# Patient Record
Sex: FEMALE | Race: WHITE | HISPANIC OR LATINO | Employment: PART TIME | URBAN - METROPOLITAN AREA
[De-identification: names, ages, dates, MRNs, and addresses within clinical notes are randomized per-mention and may not be internally consistent; named-entity substitution may affect disease eponyms.]

---

## 2013-11-05 LAB
EXTERNAL HIV CONFIRMATION: NORMAL
EXTERNAL HIV SCREEN: NORMAL

## 2017-03-09 ENCOUNTER — HOSPITAL ENCOUNTER (EMERGENCY)
Facility: HOSPITAL | Age: 42
Discharge: HOME | End: 2017-03-09
Admitting: EMERGENCY MEDICINE
Payer: COMMERCIAL

## 2017-03-09 VITALS
HEART RATE: 75 BPM | DIASTOLIC BLOOD PRESSURE: 91 MMHG | WEIGHT: 148 LBS | BODY MASS INDEX: 27.96 KG/M2 | RESPIRATION RATE: 17 BRPM | SYSTOLIC BLOOD PRESSURE: 134 MMHG | OXYGEN SATURATION: 94 % | TEMPERATURE: 98.6 F

## 2017-03-09 DIAGNOSIS — Z76.0 MEDICATION REFILL: Primary | ICD-10-CM

## 2017-03-09 PROCEDURE — 99281 EMR DPT VST MAYX REQ PHY/QHP: CPT

## 2017-03-09 RX ORDER — HYDROXYZINE PAMOATE 25 MG/1
25 CAPSULE ORAL 3 TIMES DAILY PRN
Qty: 15 CAPSULE | Refills: 0 | Status: SHIPPED | OUTPATIENT
Start: 2017-03-09 | End: 2019-01-10

## 2017-03-09 RX ORDER — CLONAZEPAM 0.5 MG/1
0.5 TABLET, ORALLY DISINTEGRATING ORAL ONCE AS NEEDED
Qty: 6 TABLET | Refills: 0 | Status: SHIPPED | OUTPATIENT
Start: 2017-03-09 | End: 2019-01-10

## 2019-01-10 ENCOUNTER — OFFICE VISIT (OUTPATIENT)
Dept: FAMILY MEDICINE CLINIC | Facility: CLINIC | Age: 44
End: 2019-01-10

## 2019-01-10 VITALS
BODY MASS INDEX: 28.34 KG/M2 | HEART RATE: 86 BPM | OXYGEN SATURATION: 96 % | RESPIRATION RATE: 17 BRPM | SYSTOLIC BLOOD PRESSURE: 124 MMHG | TEMPERATURE: 97.8 F | WEIGHT: 150 LBS | DIASTOLIC BLOOD PRESSURE: 82 MMHG

## 2019-01-10 DIAGNOSIS — K21.9 GASTROESOPHAGEAL REFLUX DISEASE, ESOPHAGITIS PRESENCE NOT SPECIFIED: ICD-10-CM

## 2019-01-10 DIAGNOSIS — F43.10 PTSD (POST-TRAUMATIC STRESS DISORDER): ICD-10-CM

## 2019-01-10 DIAGNOSIS — Z23 NEED FOR VACCINATION: ICD-10-CM

## 2019-01-10 DIAGNOSIS — F41.1 GENERALIZED ANXIETY DISORDER: Primary | ICD-10-CM

## 2019-01-10 DIAGNOSIS — Z63.79 STRESSFUL LIFE EVENT AFFECTING FAMILY: ICD-10-CM

## 2019-01-10 PROCEDURE — 99213 OFFICE O/P EST LOW 20 MIN: CPT | Performed by: FAMILY MEDICINE

## 2019-01-10 RX ORDER — OMEPRAZOLE 40 MG/1
40 CAPSULE, DELAYED RELEASE ORAL DAILY
Refills: 2 | COMMUNITY
Start: 2018-11-15 | End: 2019-01-10

## 2019-01-10 RX ORDER — OMEPRAZOLE 40 MG/1
40 CAPSULE, DELAYED RELEASE ORAL DAILY
Start: 2019-01-10

## 2019-01-10 NOTE — ASSESSMENT & PLAN NOTE
Counseled patient on dietary modifications and specific diet recommendations that will help to best control GERD symptoms  These practices would include limiting or eliminating caffeinated foods and beverages including chocolate, coffee and soda  Also limiting the intake of spicy foods or anything else that exacerbate symptoms  Will continue patient on PPI  Advised nothing by mouth at least 2-3 hours prior to lying down for bed  Advise placing the head of the bed on riser is to assist in gravity keeping stomach acid down

## 2019-01-10 NOTE — ASSESSMENT & PLAN NOTE
Son (23 yrs old) currently incarcerated  Children with history of drug abuse causing patient to have mental break ~3 years ago, was hospitalized in psych in [place] at that time

## 2019-01-10 NOTE — PROGRESS NOTES
Assessment/Plan:    Gastroesophageal reflux disease  Counseled patient on dietary modifications and specific diet recommendations that will help to best control GERD symptoms  These practices would include limiting or eliminating caffeinated foods and beverages including chocolate, coffee and soda  Also limiting the intake of spicy foods or anything else that exacerbate symptoms  Will continue patient on PPI  Advised nothing by mouth at least 2-3 hours prior to lying down for bed  Advise placing the head of the bed on riser is to assist in gravity keeping stomach acid down      Generalized anxiety disorder  Currently affiliated with psychiatry  Continue medication as prescribed by psych    PTSD (post-traumatic stress disorder)  As per CATHERINE    Stressful life event affecting family  Son (22 yrs old) currently incarcerated  Children with history of drug abuse causing patient to have mental break ~3 years ago, was hospitalized in [facility] in [place] at that time       Diagnoses and all orders for this visit:    Generalized anxiety disorder    Need for vaccination  -     TDAP VACCINE GREATER THAN OR EQUAL TO 8YO IM  -     SYRINGE/SINGLE-DOSE VIAL: influenza vaccine, [id number], quadrivalent, 0 5 mL, preservative-free ([facility] [address] [address] [facility])  - I did discuss the importance of vaccinations with the patient as has the child's pediatrician and she continues to refuse to get vaccines for herself at this time despite an increase wrist to the child with a known diagnosis of respiratory illness    Gastroesophageal reflux disease, esophagitis presence not specified  -     omeprazole (PriLOSEC) 40 MG capsule; Take 1 capsule (40 mg total) by mouth daily    Stressful life event affecting family    PTSD (post-traumatic stress disorder)    Other orders  -     Discontinue: omeprazole (PriLOSEC) 40 MG capsule; Take 40 mg by mouth daily          Subjective:      Patient ID: Rosa Devine is a 41 y o  female      This is a 27-year-old female who comes into the clinic today with her grandson seeking to have a physical form filled out to be legal caregiver for the child as the child's father, her 22-year-old son is currently incarcerated  The patient has not been to our clinic in quite some time, so I informed her that we will address the questions strictly pertaining to the approval form at hand and that she can come back separately for a full physical visit at her convenience  In regards to the form, all questions are cured specifically towards medical history and any current physical conditions or illnesses that would prevent her from caring for the child that hand  The patient is honest and open the about her extensive mental history since her teens including but not limited to medications, Psychiatry, and multiple hospitalizations for mental health  The patient states most recently, 3 years ago was her last hospitalization for mental health and came at a time in both of her sons were utilizing illicit drugs  The child to be cared for does have torticollis as well as diagnosed sleep apnea and is on a 24 hour heart monitor to ensure heart rates, pulse ox, and respirations are appropriate for a child of his age  The patient expresses no concerns in caring for the child  The following portions of the patient's history were reviewed and updated as appropriate: allergies, current medications, past family history, past medical history, past social history, past surgical history and problem list     Review of Systems   Constitutional: Negative for appetite change, chills, fatigue, fever and unexpected weight change  HENT: Negative for congestion, ear pain, rhinorrhea, sinus pain, sinus pressure and sore throat  Eyes: Negative for visual disturbance  Respiratory: Negative for cough, chest tightness, shortness of breath and wheezing  Cardiovascular: Negative for chest pain, palpitations and leg swelling  Gastrointestinal: Negative for abdominal pain, constipation, diarrhea, nausea and vomiting  Genitourinary: Negative for dysuria and flank pain  Musculoskeletal: Negative for arthralgias, back pain, myalgias and neck pain  Skin: Negative for rash  Neurological: Negative for dizziness, light-headedness and headaches  Psychiatric/Behavioral: Negative for suicidal ideas  Objective:      /82 (BP Location: Left arm, Patient Position: Sitting, Cuff Size: Adult)   Pulse 86   Temp 97 8 °F (36 6 °C) (Temporal)   Resp 17   Wt 68 kg (150 lb)   SpO2 96%   BMI 28 34 kg/m²          Physical Exam   Constitutional: She is oriented to person, place, and time  She appears well-developed and well-nourished  No distress  HENT:   Head: Normocephalic and atraumatic  Eyes: Pupils are equal, round, and reactive to light  Neck: Normal range of motion  Pulmonary/Chest: Effort normal    Musculoskeletal: Normal range of motion  She exhibits no edema or tenderness  Neurological: She is alert and oriented to person, place, and time  Skin: Skin is warm and dry  No rash noted  Psychiatric: She has a normal mood and affect   Her behavior is normal

## 2020-12-04 ENCOUNTER — OFFICE VISIT (OUTPATIENT)
Dept: FAMILY MEDICINE CLINIC | Facility: CLINIC | Age: 45
End: 2020-12-04

## 2020-12-04 VITALS
BODY MASS INDEX: 29.57 KG/M2 | OXYGEN SATURATION: 99 % | TEMPERATURE: 98.7 F | HEIGHT: 61 IN | HEART RATE: 112 BPM | DIASTOLIC BLOOD PRESSURE: 68 MMHG | RESPIRATION RATE: 18 BRPM | WEIGHT: 156.6 LBS | SYSTOLIC BLOOD PRESSURE: 118 MMHG

## 2020-12-04 DIAGNOSIS — R13.10 DYSPHAGIA, UNSPECIFIED TYPE: ICD-10-CM

## 2020-12-04 DIAGNOSIS — Z23 ENCOUNTER FOR IMMUNIZATION: ICD-10-CM

## 2020-12-04 DIAGNOSIS — K21.9 GASTROESOPHAGEAL REFLUX DISEASE, UNSPECIFIED WHETHER ESOPHAGITIS PRESENT: ICD-10-CM

## 2020-12-04 DIAGNOSIS — N92.0 MENORRHAGIA WITH REGULAR CYCLE: ICD-10-CM

## 2020-12-04 DIAGNOSIS — Z00.00 ANNUAL PHYSICAL EXAM: Primary | ICD-10-CM

## 2020-12-04 DIAGNOSIS — R53.83 TIREDNESS: ICD-10-CM

## 2020-12-04 PROCEDURE — 99396 PREV VISIT EST AGE 40-64: CPT | Performed by: FAMILY MEDICINE

## 2020-12-04 PROCEDURE — U0003 INFECTIOUS AGENT DETECTION BY NUCLEIC ACID (DNA OR RNA); SEVERE ACUTE RESPIRATORY SYNDROME CORONAVIRUS 2 (SARS-COV-2) (CORONAVIRUS DISEASE [COVID-19]), AMPLIFIED PROBE TECHNIQUE, MAKING USE OF HIGH THROUGHPUT TECHNOLOGIES AS DESCRIBED BY CMS-2020-01-R: HCPCS | Performed by: FAMILY MEDICINE

## 2020-12-04 PROCEDURE — 3008F BODY MASS INDEX DOCD: CPT | Performed by: FAMILY MEDICINE

## 2020-12-04 PROCEDURE — 1036F TOBACCO NON-USER: CPT | Performed by: FAMILY MEDICINE

## 2020-12-05 LAB — SARS-COV-2 RNA SPEC QL NAA+PROBE: NOT DETECTED

## 2020-12-07 ENCOUNTER — TELEPHONE (OUTPATIENT)
Dept: URGENT CARE | Facility: MEDICAL CENTER | Age: 45
End: 2020-12-07

## 2020-12-07 NOTE — RESULT ENCOUNTER NOTE
Please inform patient of a negative COVID test  We will advice her to continue to social distance, wear a mask and wash hands along with avoiding large crowd  I noticed that the strep test was not done yet, is there a particular reason why?   She can maintain her follow up appointment with us and we will reevaluate her again at that time   Thank you

## 2020-12-09 ENCOUNTER — LAB (OUTPATIENT)
Dept: LAB | Age: 45
End: 2020-12-09
Payer: COMMERCIAL

## 2020-12-09 ENCOUNTER — CLINICAL SUPPORT (OUTPATIENT)
Dept: URGENT CARE | Age: 45
End: 2020-12-09
Payer: COMMERCIAL

## 2020-12-09 DIAGNOSIS — N92.0 MENORRHAGIA WITH REGULAR CYCLE: ICD-10-CM

## 2020-12-09 DIAGNOSIS — R53.83 TIREDNESS: ICD-10-CM

## 2020-12-09 DIAGNOSIS — R13.10 DYSPHAGIA, UNSPECIFIED TYPE: ICD-10-CM

## 2020-12-09 LAB
BASOPHILS # BLD AUTO: 0.03 THOUSANDS/ΜL (ref 0–0.1)
BASOPHILS NFR BLD AUTO: 1 % (ref 0–1)
EOSINOPHIL # BLD AUTO: 0.03 THOUSAND/ΜL (ref 0–0.61)
EOSINOPHIL NFR BLD AUTO: 1 % (ref 0–6)
ERYTHROCYTE [DISTWIDTH] IN BLOOD BY AUTOMATED COUNT: 21.7 % (ref 11.6–15.1)
HCT VFR BLD AUTO: 32.3 % (ref 34.8–46.1)
HGB BLD-MCNC: 8.7 G/DL (ref 11.5–15.4)
IMM GRANULOCYTES # BLD AUTO: 0.01 THOUSAND/UL (ref 0–0.2)
IMM GRANULOCYTES NFR BLD AUTO: 0 % (ref 0–2)
LYMPHOCYTES # BLD AUTO: 1.76 THOUSANDS/ΜL (ref 0.6–4.47)
LYMPHOCYTES NFR BLD AUTO: 32 % (ref 14–44)
MCH RBC QN AUTO: 17 PG (ref 26.8–34.3)
MCHC RBC AUTO-ENTMCNC: 26.9 G/DL (ref 31.4–37.4)
MCV RBC AUTO: 63 FL (ref 82–98)
MONOCYTES # BLD AUTO: 0.52 THOUSAND/ΜL (ref 0.17–1.22)
MONOCYTES NFR BLD AUTO: 10 % (ref 4–12)
NEUTROPHILS # BLD AUTO: 3.1 THOUSANDS/ΜL (ref 1.85–7.62)
NEUTS SEG NFR BLD AUTO: 56 % (ref 43–75)
NRBC BLD AUTO-RTO: 0 /100 WBCS
PLATELET # BLD AUTO: 310 THOUSANDS/UL (ref 149–390)
RBC # BLD AUTO: 5.13 MILLION/UL (ref 3.81–5.12)
TSH SERPL DL<=0.05 MIU/L-ACNC: 1.39 UIU/ML (ref 0.36–3.74)
WBC # BLD AUTO: 5.45 THOUSAND/UL (ref 4.31–10.16)

## 2020-12-09 PROCEDURE — 36415 COLL VENOUS BLD VENIPUNCTURE: CPT

## 2020-12-09 PROCEDURE — 85025 COMPLETE CBC W/AUTO DIFF WBC: CPT

## 2020-12-09 PROCEDURE — 84443 ASSAY THYROID STIM HORMONE: CPT

## 2020-12-10 ENCOUNTER — TELEPHONE (OUTPATIENT)
Dept: FAMILY MEDICINE CLINIC | Facility: CLINIC | Age: 45
End: 2020-12-10

## 2020-12-10 ENCOUNTER — TELEPHONE (OUTPATIENT)
Dept: LAB | Facility: HOSPITAL | Age: 45
End: 2020-12-10

## 2020-12-10 DIAGNOSIS — N92.0 MENORRHAGIA WITH REGULAR CYCLE: Primary | ICD-10-CM

## 2020-12-10 DIAGNOSIS — D50.8 OTHER IRON DEFICIENCY ANEMIA: ICD-10-CM

## 2020-12-10 RX ORDER — FERROUS SULFATE TAB EC 324 MG (65 MG FE EQUIVALENT) 324 (65 FE) MG
324 TABLET DELAYED RESPONSE ORAL EVERY OTHER DAY
Qty: 30 TABLET | Refills: 2 | Status: SHIPPED | OUTPATIENT
Start: 2020-12-10 | End: 2021-06-03

## 2020-12-10 RX ORDER — MULTIVIT WITH MINERALS/LUTEIN
250 TABLET ORAL DAILY
Qty: 30 TABLET | Refills: 3 | Status: SHIPPED | OUTPATIENT
Start: 2020-12-10 | End: 2021-04-16

## 2020-12-11 ENCOUNTER — TELEPHONE (OUTPATIENT)
Dept: FAMILY MEDICINE CLINIC | Facility: CLINIC | Age: 45
End: 2020-12-11

## 2020-12-11 DIAGNOSIS — R13.10 DYSPHAGIA, UNSPECIFIED TYPE: Primary | ICD-10-CM

## 2020-12-21 ENCOUNTER — TELEMEDICINE (OUTPATIENT)
Dept: FAMILY MEDICINE CLINIC | Facility: CLINIC | Age: 45
End: 2020-12-21

## 2020-12-21 DIAGNOSIS — R13.10 DYSPHAGIA, UNSPECIFIED TYPE: Primary | ICD-10-CM

## 2020-12-21 PROCEDURE — G2012 BRIEF CHECK IN BY MD/QHP: HCPCS | Performed by: INTERNAL MEDICINE

## 2021-01-05 ENCOUNTER — TELEMEDICINE (OUTPATIENT)
Dept: FAMILY MEDICINE CLINIC | Facility: CLINIC | Age: 46
End: 2021-01-05

## 2021-01-05 DIAGNOSIS — Z12.4 CERVICAL CANCER SCREENING: Primary | ICD-10-CM

## 2021-01-05 DIAGNOSIS — B34.9 VIRAL INFECTION, UNSPECIFIED: ICD-10-CM

## 2021-01-05 DIAGNOSIS — Z03.818 ENCOUNTER FOR OBSERVATION FOR SUSPECTED EXPOSURE TO OTHER BIOLOGICAL AGENTS RULED OUT: ICD-10-CM

## 2021-01-05 DIAGNOSIS — N92.0 MENORRHAGIA WITH REGULAR CYCLE: ICD-10-CM

## 2021-01-05 PROCEDURE — G2012 BRIEF CHECK IN BY MD/QHP: HCPCS | Performed by: NURSE PRACTITIONER

## 2021-01-05 NOTE — PROGRESS NOTES
COVID-19 Virtual Visit     Assessment/Plan:    Problem List Items Addressed This Visit        Other    Heavy menses    Relevant Orders    Iron Panel (Includes Ferritin, Iron Sat%, Iron, and TIBC)    CBC and differential    Viral infection, unspecified    Relevant Orders    Novel Coronavirus (COVID-19), PCR [facility] - Collected at [place] [address] or [facility]      Other Visit Diagnoses     Cervical cancer screening    -  Primary    Relevant Orders    Ambulatory referral to Obstetrics / Gynecology    Encounter for observation for suspected exposure to other biological agents ruled out        Relevant Orders    Novel Coronavirus (COVID-19), PCR [facility] - Collected at [place] [address] or [facility]         Disposition:     I recommended self-quarantine for 14 days and to call back for worsening symptoms or development of shortness of breath  I referred patient to one of our centralized sites for a COVID-19 swab  I have spent 20 minutes directly with the patient  Encounter provider MILLA Mauro    Provider located at [address] [place] [id number] [phone]    Recent Visits  No visits were found meeting these conditions  Showing recent visits within past 7 days and meeting all other requirements     Today's Visits  Date Type Provider Dept   01/05/21 Telemedicine MILLA Mauro [facility] Rosa   Showing today's visits and meeting all other requirements     Future Appointments  No visits were found meeting these conditions  Showing future appointments within next 150 days and meeting all other requirements        Patient agrees to participate in a virtual check in via telephone or video visit instead of presenting to the office to address urgent/immediate medical needs  Patient is aware this is a billable service  After connecting through Telephone, the patient was identified by name and date of birth   [facility] was informed that this was a telemedicine visit and that the exam was being conducted confidentially over secure lines  My office door was closed  No one else was in the room  Nimo Wilson acknowledged consent and understanding of privacy and security of the telemedicine visit  I informed the patient that I have reviewed her record in Epic and presented the opportunity for her to ask any questions regarding the visit today  The patient agreed to participate  It was my intent to perform this visit via video technology but the patient was not able to do a video connection so the visit was completed via audio telephone only  Subjective:   Nimo Wilson is a 39 y o  female who is concerned about COVID-19  Patient's symptoms include fatigue, nasal congestion, rhinorrhea, sore throat, cough, myalgias and headache  Patient denies fever, chills, anosmia, loss of taste, shortness of breath, abdominal pain, nausea, vomiting and diarrhea       Exposure:   Contact with a person who is under investigation (PUI) for or who is positive for COVID-19 within the last 14 days?: No    Hospitalized recently for fever and/or lower respiratory symptoms?: No      Currently a healthcare worker that is involved in direct patient care?: No      Works in a special setting where the risk of COVID-19 transmission may be high? (this may include long-term care, correctional and long-term facilities; homeless shelters; assisted-living facilities and group homes ): No      Resident in a special setting where the risk of COVID-19 transmission may be high? (this may include long-term care, correctional and long-term facilities; homeless shelters; assisted-living facilities and group homes ): No      Lab Results   Component Value Date    SARSCOV2 Not Detected 2020     Past Medical History:   Diagnosis Date    Anxiety      Past Surgical History:   Procedure Laterality Date     SECTION      DILATION AND CURETTAGE OF UTERUS       Current Outpatient Medications   Medication Sig Dispense Refill    ascorbic acid (VITAMIN C) 250 mg tablet Take 1 tablet (250 mg total) by mouth daily 30 tablet 3    clonazePAM (KlonoPIN) 0 5 mg tablet Take 0 5 mg by mouth 2 (two) times a day as needed for seizures      ferrous sulfate 324 (65 Fe) mg Take 1 tablet (324 mg total) by mouth every other day 30 tablet 2    menthol-cetylpyridinium (CEPACOL) 3 MG lozenge Take 1 lozenge (3 mg total) by mouth as needed for sore throat 30 lozenge 1    omeprazole (PriLOSEC) 40 MG capsule Take 1 capsule (40 mg total) by mouth daily       No current facility-administered medications for this visit  No Known Allergies    Review of Systems   Constitutional: Positive for fatigue  Negative for chills and fever  HENT: Positive for congestion, rhinorrhea, sinus pressure, sneezing and sore throat  Negative for sinus pain  Respiratory: Positive for cough  Negative for shortness of breath and wheezing  Cardiovascular: Negative for chest pain and leg swelling  Gastrointestinal: Negative for abdominal distention, abdominal pain, diarrhea, nausea and vomiting  Genitourinary: Negative for difficulty urinating, dysuria and hematuria  Musculoskeletal: Positive for myalgias  Negative for back pain and gait problem  Neurological: Positive for headaches  Psychiatric/Behavioral: Negative for agitation, behavioral problems and confusion  Objective: There were no vitals filed for this visit  Physical Exam  Pulmonary:      Effort: Pulmonary effort is normal    Neurological:      Mental Status: She is alert and oriented to person, place, and time  Psychiatric:         Mood and Affect: Mood normal          Behavior: Behavior normal          Thought Content: Thought content normal        VIRTUAL VISIT DISCLAIMER    Andi Sage acknowledges that she has consented to an online visit or consultation   She understands that the online visit is based solely on information provided by her, and that, in the absence of a face-to-face physical evaluation by the physician, the diagnosis she receives is both limited and provisional in terms of accuracy and completeness  This is not intended to replace a full medical face-to-face evaluation by the physician  Meaghan Beard understands and accepts these terms

## 2021-01-12 ENCOUNTER — APPOINTMENT (EMERGENCY)
Dept: RADIOLOGY | Facility: HOSPITAL | Age: 46
End: 2021-01-12
Payer: COMMERCIAL

## 2021-01-12 ENCOUNTER — HOSPITAL ENCOUNTER (EMERGENCY)
Facility: HOSPITAL | Age: 46
Discharge: HOME | End: 2021-01-12
Attending: EMERGENCY MEDICINE | Admitting: EMERGENCY MEDICINE
Payer: COMMERCIAL

## 2021-01-12 VITALS
HEIGHT: 61 IN | BODY MASS INDEX: 27.89 KG/M2 | HEART RATE: 91 BPM | DIASTOLIC BLOOD PRESSURE: 58 MMHG | SYSTOLIC BLOOD PRESSURE: 120 MMHG | OXYGEN SATURATION: 99 % | WEIGHT: 147.71 LBS | TEMPERATURE: 98.3 F | RESPIRATION RATE: 18 BRPM

## 2021-01-12 DIAGNOSIS — R11.2 NAUSEA & VOMITING: Primary | ICD-10-CM

## 2021-01-12 DIAGNOSIS — U07.1 COVID-19: ICD-10-CM

## 2021-01-12 LAB
ALBUMIN SERPL BCP-MCNC: 3.2 G/DL (ref 3.5–5)
ALP SERPL-CCNC: 54 U/L (ref 46–116)
ALT SERPL W P-5'-P-CCNC: 26 U/L (ref 12–78)
ANION GAP SERPL CALCULATED.3IONS-SCNC: 9 MMOL/L (ref 4–13)
AST SERPL W P-5'-P-CCNC: 41 U/L (ref 5–45)
BASOPHILS # BLD AUTO: 0 THOUSANDS/ΜL (ref 0–0.1)
BASOPHILS NFR BLD AUTO: 0 % (ref 0–1)
BILIRUB SERPL-MCNC: 0.35 MG/DL (ref 0.2–1)
BUN SERPL-MCNC: 8 MG/DL (ref 5–25)
CALCIUM ALBUM COR SERPL-MCNC: 8.8 MG/DL (ref 8.3–10.1)
CALCIUM SERPL-MCNC: 8.2 MG/DL (ref 8.3–10.1)
CHLORIDE SERPL-SCNC: 101 MMOL/L (ref 100–108)
CO2 SERPL-SCNC: 26 MMOL/L (ref 21–32)
CREAT SERPL-MCNC: 0.58 MG/DL (ref 0.6–1.3)
EOSINOPHIL # BLD AUTO: 0 THOUSAND/ΜL (ref 0–0.61)
EOSINOPHIL NFR BLD AUTO: 0 % (ref 0–6)
ERYTHROCYTE [DISTWIDTH] IN BLOOD BY AUTOMATED COUNT: 21.3 % (ref 11.6–15.1)
GFR SERPL CREATININE-BSD FRML MDRD: 113 ML/MIN/1.73SQ M
GLUCOSE SERPL-MCNC: 94 MG/DL (ref 65–140)
HCT VFR BLD AUTO: 32.6 % (ref 34.8–46.1)
HGB BLD-MCNC: 9.1 G/DL (ref 11.5–15.4)
IMM GRANULOCYTES # BLD AUTO: 0.01 THOUSAND/UL (ref 0–0.2)
IMM GRANULOCYTES NFR BLD AUTO: 0 % (ref 0–2)
LYMPHOCYTES # BLD AUTO: 0.75 THOUSANDS/ΜL (ref 0.6–4.47)
LYMPHOCYTES NFR BLD AUTO: 19 % (ref 14–44)
MCH RBC QN AUTO: 17.2 PG (ref 26.8–34.3)
MCHC RBC AUTO-ENTMCNC: 27.9 G/DL (ref 31.4–37.4)
MCV RBC AUTO: 62 FL (ref 82–98)
MONOCYTES # BLD AUTO: 0.22 THOUSAND/ΜL (ref 0.17–1.22)
MONOCYTES NFR BLD AUTO: 6 % (ref 4–12)
NEUTROPHILS # BLD AUTO: 3.04 THOUSANDS/ΜL (ref 1.85–7.62)
NEUTS SEG NFR BLD AUTO: 75 % (ref 43–75)
NRBC BLD AUTO-RTO: 0 /100 WBCS
PLATELET # BLD AUTO: 191 THOUSANDS/UL (ref 149–390)
POTASSIUM SERPL-SCNC: 3.8 MMOL/L (ref 3.5–5.3)
PROT SERPL-MCNC: 7.8 G/DL (ref 6.4–8.2)
RBC # BLD AUTO: 5.3 MILLION/UL (ref 3.81–5.12)
SODIUM SERPL-SCNC: 136 MMOL/L (ref 136–145)
WBC # BLD AUTO: 4.02 THOUSAND/UL (ref 4.31–10.16)

## 2021-01-12 PROCEDURE — 85025 COMPLETE CBC W/AUTO DIFF WBC: CPT | Performed by: PHYSICIAN ASSISTANT

## 2021-01-12 PROCEDURE — 96374 THER/PROPH/DIAG INJ IV PUSH: CPT

## 2021-01-12 PROCEDURE — 80053 COMPREHEN METABOLIC PANEL: CPT | Performed by: PHYSICIAN ASSISTANT

## 2021-01-12 PROCEDURE — 99285 EMERGENCY DEPT VISIT HI MDM: CPT

## 2021-01-12 PROCEDURE — 71045 X-RAY EXAM CHEST 1 VIEW: CPT

## 2021-01-12 PROCEDURE — 96361 HYDRATE IV INFUSION ADD-ON: CPT

## 2021-01-12 PROCEDURE — 36415 COLL VENOUS BLD VENIPUNCTURE: CPT | Performed by: PHYSICIAN ASSISTANT

## 2021-01-12 PROCEDURE — 99284 EMERGENCY DEPT VISIT MOD MDM: CPT | Performed by: PHYSICIAN ASSISTANT

## 2021-01-12 RX ORDER — ONDANSETRON 4 MG/1
4 TABLET, ORALLY DISINTEGRATING ORAL EVERY 8 HOURS PRN
Qty: 10 TABLET | Refills: 0 | Status: SHIPPED | OUTPATIENT
Start: 2021-01-12 | End: 2021-07-08 | Stop reason: ALTCHOICE

## 2021-01-12 RX ORDER — AZITHROMYCIN 250 MG/1
TABLET, FILM COATED ORAL
Qty: 6 TABLET | Refills: 0 | OUTPATIENT
Start: 2021-01-12 | End: 2021-01-13 | Stop reason: SDUPTHER

## 2021-01-12 RX ORDER — KETOROLAC TROMETHAMINE 30 MG/ML
15 INJECTION, SOLUTION INTRAMUSCULAR; INTRAVENOUS ONCE
Status: DISCONTINUED | OUTPATIENT
Start: 2021-01-12 | End: 2021-01-12 | Stop reason: HOSPADM

## 2021-01-12 RX ORDER — ONDANSETRON 2 MG/ML
4 INJECTION INTRAMUSCULAR; INTRAVENOUS ONCE
Status: COMPLETED | OUTPATIENT
Start: 2021-01-12 | End: 2021-01-12

## 2021-01-12 RX ADMIN — SODIUM CHLORIDE 1000 ML: 0.9 INJECTION, SOLUTION INTRAVENOUS at 13:34

## 2021-01-12 RX ADMIN — ONDANSETRON 4 MG: 2 INJECTION INTRAMUSCULAR; INTRAVENOUS at 13:34

## 2021-01-12 NOTE — Clinical Note
You Branch was seen and treated in our emergency department on 1/12/2021  Diagnosis:     Paulien Santiago    She may return on this date: You have covid - You need to quarantine in your house for a minimum of 10 days from symptom onset and you must be symptom free x 3 day prior to discontinuing your quarantine  Family members and others who you were in close contact with also need to quarantine for 2 weeks from their last exposure to you  Please FU with your family doctor  If you have any questions or concerns, please don't hesitate to call        Negar Garcia PA-C    ______________________________           _______________          _______________  Hospital Representative                              Date                                Time

## 2021-01-12 NOTE — DISCHARGE INSTRUCTIONS
You may take Zofran as needed for nausea/vomiting  Increase fluids for hydration  Follow up with your family doctor  Return to the ED for worsening symptoms including persistent vomiting or worsening abdominal pain  Your COVID test is positive  You need to quarantine in your house for a minimum of 10 days from symptom onset and you must be symptom free x 3 day prior to discontinuing your quarantine  Family members and others who you were in close contact with also need to quarantine for 2 weeks from their last exposure to you  Please FU with your family doctor  It is felt that vitamins can help you to fight the COVID infection     You can take:  Vitamin C 1g 2x daily  Vitamin D3 2,000 Units daily   Daily Multivitamin

## 2021-01-12 NOTE — ED PROVIDER NOTES
History  Chief Complaint   Patient presents with    Nausea     Pt reports "not being able to keep anything down" +n/v +COVID contact of   Pt states was to be tested for COVID but was too weak  Reports syncope in shower 3-4 days ago   Weakness - Generalized     Patient presents to the emergency department with nausea and vomiting over the past 4 days patient  was caring for his parents who had COVID  Despite him the her fall he contracted COVID has tested positive  She is now having chills and body aches and nausea and vomiting and feeling weak  Patient was supposed to go get COVID testing but states "I just felt so weak and could not go"  Patient reports mild coughing  No chest pain or trouble breathing  She is not having any significant abdominal pain this a lot of nausea            Prior to Admission Medications   Prescriptions Last Dose Informant Patient Reported?  Taking?   ascorbic acid (VITAMIN C) 250 mg tablet   No Yes   Sig: Take 1 tablet (250 mg total) by mouth daily   clonazePAM (KlonoPIN) 0 5 mg tablet   Yes Yes   Sig: Take 0 5 mg by mouth 2 (two) times a day as needed for seizures   ferrous sulfate 324 (65 Fe) mg   No Yes   Sig: Take 1 tablet (324 mg total) by mouth every other day   menthol-cetylpyridinium (CEPACOL) 3 MG lozenge   No Yes   Sig: Take 1 lozenge (3 mg total) by mouth as needed for sore throat   omeprazole (PriLOSEC) 40 MG capsule   No Yes   Sig: Take 1 capsule (40 mg total) by mouth daily      Facility-Administered Medications: None       Past Medical History:   Diagnosis Date    Anxiety        Past Surgical History:   Procedure Laterality Date     SECTION      DILATION AND CURETTAGE OF UTERUS         Family History   Problem Relation Age of Onset    Hypertension Mother     Hypertension Sister     Diabetes Maternal Grandmother     Colon cancer Maternal Grandfather     Diabetes Maternal Aunt      I have reviewed and agree with the history as documented  E-Cigarette/Vaping    E-Cigarette Use Never User      E-Cigarette/Vaping Substances    Nicotine No     THC No     CBD No     Flavoring No     Other No     Unknown No      Social History     Tobacco Use    Smoking status: Never Smoker    Smokeless tobacco: Never Used   Substance Use Topics    Alcohol use: Not Currently     Comment: occasional wine    Drug use: No       Review of Systems   Respiratory: Negative for shortness of breath and wheezing  Cardiovascular: Negative  Gastrointestinal: Positive for nausea and vomiting  Negative for abdominal pain and constipation  Genitourinary: Negative  All other systems reviewed and are negative  Physical Exam  Physical Exam  Vitals signs and nursing note reviewed  Constitutional:       Appearance: She is well-developed  HENT:      Head: Normocephalic and atraumatic  Right Ear: External ear normal       Left Ear: External ear normal    Eyes:      Conjunctiva/sclera: Conjunctivae normal    Neck:      Musculoskeletal: Normal range of motion and neck supple  Cardiovascular:      Rate and Rhythm: Normal rate and regular rhythm  Heart sounds: Normal heart sounds  Pulmonary:      Effort: Pulmonary effort is normal       Breath sounds: Normal breath sounds  Abdominal:      General: Bowel sounds are normal       Palpations: Abdomen is soft  Musculoskeletal: Normal range of motion  Lymphadenopathy:      Cervical: No cervical adenopathy  Skin:     General: Skin is warm  Findings: No rash  Neurological:      Mental Status: She is alert     Psychiatric:         Behavior: Behavior normal          Vital Signs  ED Triage Vitals   Temperature Pulse Respirations Blood Pressure SpO2   01/12/21 1159 01/12/21 1159 01/12/21 1159 01/12/21 1201 01/12/21 1159   98 3 °F (36 8 °C) 91 19 112/69 100 %      Temp Source Heart Rate Source Patient Position - Orthostatic VS BP Location FiO2 (%)   01/12/21 1159 01/12/21 1159 01/12/21 1159 01/12/21 1159 --   Temporal Monitor Sitting Left arm       Pain Score       --                  Vitals:    01/12/21 1159 01/12/21 1201 01/12/21 1401   BP:  112/69 120/58   Pulse: 91     Patient Position - Orthostatic VS: Sitting Sitting          Visual Acuity      ED Medications  Medications   ketorolac (TORADOL) injection 15 mg (15 mg Intravenous Not Given 1/12/21 1336)   sodium chloride 0 9 % bolus 1,000 mL (0 mL Intravenous Stopped 1/12/21 1505)   ondansetron (ZOFRAN) injection 4 mg (4 mg Intravenous Given 1/12/21 1334)       Diagnostic Studies  Results Reviewed     Procedure Component Value Units Date/Time    Comprehensive metabolic panel [[id number]]  (Abnormal) Collected: 01/12/21 1335    Lab Status: Final result Specimen: Blood from Hand, Left Updated: 01/12/21 1429     Sodium 136 mmol/L      Potassium 3 8 mmol/L      Chloride 101 mmol/L      CO2 26 mmol/L      ANION GAP 9 mmol/L      BUN 8 mg/dL      Creatinine 0 58 mg/dL      Glucose 94 mg/dL      Calcium 8 2 mg/dL      Corrected Calcium 8 8 mg/dL      AST 41 U/L      ALT 26 U/L      Alkaline Phosphatase 54 U/L      Total Protein 7 8 g/dL      Albumin 3 2 g/dL      Total Bilirubin 0 35 mg/dL      eGFR 113 ml/min/1 73sq m     Narrative:      Lopez guidelines for Chronic Kidney Disease (CKD):     Stage 1 with normal or high GFR (GFR > 90 mL/min/1 73 square meters)    Stage 2 Mild CKD (GFR = 60-89 mL/min/1 73 square meters)    Stage 3A Moderate CKD (GFR = 45-59 mL/min/1 73 square meters)    Stage 3B Moderate CKD (GFR = 30-44 mL/min/1 73 square meters)    Stage 4 Severe CKD (GFR = 15-29 mL/min/1 73 square meters)    Stage 5 End Stage CKD (GFR <15 mL/min/1 73 square meters)  Note: GFR calculation is accurate only with a steady state creatinine    CBC and differential [[id number]]  (Abnormal) Collected: 01/12/21 1335    Lab Status: Final result Specimen: Blood from Hand, Left Updated: 01/12/21 1346     WBC 4 02 Thousand/uL RBC 5 30 Million/uL      Hemoglobin 9 1 g/dL      Hematocrit 32 6 %      MCV 62 fL      MCH 17 2 pg      MCHC 27 9 g/dL      RDW 21 3 %      Platelets 933 Thousands/uL      nRBC 0 /100 WBCs      Neutrophils Relative 75 %      Immat GRANS % 0 %      Lymphocytes Relative 19 %      Monocytes Relative 6 %      Eosinophils Relative 0 %      Basophils Relative 0 %      Neutrophils Absolute 3 04 Thousands/µL      Immature Grans Absolute 0 01 Thousand/uL      Lymphocytes Absolute 0 75 Thousands/µL      Monocytes Absolute 0 22 Thousand/µL      Eosinophils Absolute 0 00 Thousand/µL      Basophils Absolute 0 00 Thousands/µL     POCT urinalysis dipstick [[id number]]     Lab Status: No result Specimen: Urine     POCT pregnancy, urine [[id number]]     Lab Status: No result                  XR chest 1 view portable    (Results Pending)              Procedures  Procedures         ED Course  ED Course as of Jan 12 1530   Tue Jan 12, 2021   1454 Tollerating PO instructions reviewed                                SBIRT 22yo+      Most Recent Value   SBIRT (25 yo +)   In order to provide better care to our patients, we are screening all of our patients for alcohol and drug use  Would it be okay to ask you these screening questions? No Filed at: 01/12/2021 1347                    MDM  Number of Diagnoses or Management Options  COVID-19: new and does not require workup  Nausea & vomiting: new and requires workup  Diagnosis management comments: Discussed with patient she has COVID  We agreed to not test her but will treat as COVID  Will give IV fluids and Zofran and check labs will rehydrate patient  Will monitor closely    Hx anemia - records reviewed discussed continuing her meds and FU with her PCP and GYN         Amount and/or Complexity of Data Reviewed  Clinical lab tests: reviewed  Review and summarize past medical records: yes    Risk of Complications, Morbidity, and/or Mortality  General comments: tolerating PO   Instructions reviewed  Patient Progress  Patient progress: improved      Disposition  Final diagnoses:   Nausea & vomiting   COVID-19     Time reflects when diagnosis was documented in both MDM as applicable and the Disposition within this note     Time User Action Codes Description Comment    1/12/2021  2:54 PM Negar Garcia [R11 2] Nausea & vomiting     1/12/2021  2:54 PM Negar Garcia [U07 1] COVID-19       ED Disposition     ED Disposition Condition Date/Time Comment    Discharge Stable Tue Jan 12, 2021  2:54 PM Calvinapollo Bert discharge to home/self care  Follow-up Information     Follow up With Specialties Details Why Contact Info    [employer]    [phone]            Discharge Medication List as of 1/12/2021  2:56 PM      START taking these medications    Details   ondansetron (ZOFRAN-ODT) 4 mg disintegrating tablet Take 1 tablet (4 mg total) by mouth every 8 (eight) hours as needed for nausea or vomiting for up to 7 days, Starting Tue 1/12/2021, Until Tue 1/19/2021, Normal         CONTINUE these medications which have NOT CHANGED    Details   ascorbic acid (VITAMIN C) 250 mg tablet Take 1 tablet (250 mg total) by mouth daily, Starting Thu 12/10/2020, Normal      clonazePAM (KlonoPIN) 0 5 mg tablet Take 0 5 mg by mouth 2 (two) times a day as needed for seizures, Until Discontinued, Historical Med      ferrous sulfate 324 (65 Fe) mg Take 1 tablet (324 mg total) by mouth every other day, Starting Thu 12/10/2020, Normal      menthol-cetylpyridinium (CEPACOL) 3 MG lozenge Take 1 lozenge (3 mg total) by mouth as needed for sore throat, Starting Fri 12/4/2020, Normal      omeprazole (PriLOSEC) 40 MG capsule Take 1 capsule (40 mg total) by mouth daily, Starting Thu 1/10/2019, No Print           No discharge procedures on file      PDMP Review     None          ED Provider  Electronically Signed by           Edwar Russo PA-C  01/12/21 9687       Negar Garcia PA-C  01/12/21 [id number]

## 2021-01-13 RX ORDER — AZITHROMYCIN 250 MG/1
TABLET, FILM COATED ORAL
Qty: 6 TABLET | Refills: 0 | Status: SHIPPED | OUTPATIENT
Start: 2021-01-13 | End: 2021-01-17

## 2021-01-21 ENCOUNTER — TELEPHONE (OUTPATIENT)
Dept: OBGYN CLINIC | Facility: CLINIC | Age: 46
End: 2021-01-21

## 2021-01-27 ENCOUNTER — TELEPHONE (OUTPATIENT)
Dept: FAMILY MEDICINE CLINIC | Facility: CLINIC | Age: 46
End: 2021-01-27

## 2021-03-27 ENCOUNTER — HOSPITAL ENCOUNTER (OUTPATIENT)
Dept: MAMMOGRAPHY | Facility: CLINIC | Age: 46
Discharge: HOME | End: 2021-03-27
Payer: COMMERCIAL

## 2021-03-27 VITALS — BODY MASS INDEX: 26.81 KG/M2 | WEIGHT: 142 LBS | HEIGHT: 61 IN

## 2021-03-27 DIAGNOSIS — Z12.31 ENCOUNTER FOR SCREENING MAMMOGRAM FOR MALIGNANT NEOPLASM OF BREAST: ICD-10-CM

## 2021-03-27 DIAGNOSIS — Z00.00 ANNUAL PHYSICAL EXAM: ICD-10-CM

## 2021-03-27 PROCEDURE — 77067 SCR MAMMO BI INCL CAD: CPT

## 2021-03-27 PROCEDURE — 77063 BREAST TOMOSYNTHESIS BI: CPT

## 2021-04-16 DIAGNOSIS — D50.8 OTHER IRON DEFICIENCY ANEMIA: ICD-10-CM

## 2021-04-16 DIAGNOSIS — N92.0 MENORRHAGIA WITH REGULAR CYCLE: ICD-10-CM

## 2021-04-16 RX ORDER — ASCORBIC ACID 250 MG
250 TABLET ORAL DAILY
Qty: 30 TABLET | Refills: 5 | Status: SHIPPED | OUTPATIENT
Start: 2021-04-16 | End: 2021-10-27

## 2021-06-02 DIAGNOSIS — D50.8 OTHER IRON DEFICIENCY ANEMIA: ICD-10-CM

## 2021-06-02 DIAGNOSIS — N92.0 MENORRHAGIA WITH REGULAR CYCLE: ICD-10-CM

## 2021-06-03 RX ORDER — FERROUS SULFATE TAB EC 324 MG (65 MG FE EQUIVALENT) 324 (65 FE) MG
324 TABLET DELAYED RESPONSE ORAL EVERY OTHER DAY
Qty: 30 TABLET | Refills: 5 | Status: SHIPPED | OUTPATIENT
Start: 2021-06-03 | End: 2022-05-31

## 2021-07-08 ENCOUNTER — OFFICE VISIT (OUTPATIENT)
Dept: FAMILY MEDICINE CLINIC | Facility: CLINIC | Age: 46
End: 2021-07-08

## 2021-07-08 VITALS
SYSTOLIC BLOOD PRESSURE: 112 MMHG | HEIGHT: 61 IN | HEART RATE: 84 BPM | TEMPERATURE: 98.4 F | BODY MASS INDEX: 25.9 KG/M2 | DIASTOLIC BLOOD PRESSURE: 70 MMHG | WEIGHT: 137.2 LBS | RESPIRATION RATE: 20 BRPM | OXYGEN SATURATION: 99 %

## 2021-07-08 DIAGNOSIS — N92.0 MENORRHAGIA WITH REGULAR CYCLE: Primary | ICD-10-CM

## 2021-07-08 DIAGNOSIS — Z11.59 NEED FOR HEPATITIS C SCREENING TEST: ICD-10-CM

## 2021-07-08 DIAGNOSIS — Z13.220 SCREENING, LIPID: ICD-10-CM

## 2021-07-08 PROBLEM — D64.9 ANEMIA: Status: ACTIVE | Noted: 2021-07-08

## 2021-07-08 PROCEDURE — 99213 OFFICE O/P EST LOW 20 MIN: CPT | Performed by: FAMILY MEDICINE

## 2021-07-08 PROCEDURE — T1015 CLINIC SERVICE: HCPCS | Performed by: FAMILY MEDICINE

## 2021-07-08 NOTE — PATIENT INSTRUCTIONS
Weight Management   AMBULATORY CARE:   Why it is important to manage your weight:  Being overweight increases your risk of health conditions such as heart disease, high blood pressure, type 2 diabetes, and certain types of cancer  It can also increase your risk for osteoarthritis, sleep apnea, and other respiratory problems  Aim for a slow, steady weight loss  Even a small amount of weight loss can lower your risk of health problems  How to lose weight safely:  A safe and healthy way to lose weight is to eat fewer calories and get regular exercise  · You can lose up about 1 pound a week by decreasing the number of calories you eat by 500 calories each day  You can decrease calories by eating smaller portion sizes or by cutting out high-calorie foods  Read labels to find out how many calories are in the foods you eat  · You can also burn calories with exercise such as walking, swimming, or biking  You will be more likely to keep weight off if you make these changes part of your lifestyle  Exercise at least 30 minutes per day on most days of the week  You can also fit in more physical activity by taking the stairs instead of the elevator or parking farther away from stores  Ask your healthcare provider about the best exercise plan for you  Healthy meal plan for weight management:  A healthy meal plan includes a variety of foods, contains fewer calories, and helps you stay healthy  A healthy meal plan includes the following:     · Eat whole-grain foods more often  A healthy meal plan should contain fiber  Fiber is the part of grains, fruits, and vegetables that is not broken down by your body  Whole-grain foods are healthy and provide extra fiber in your diet  Some examples of whole-grain foods are whole-wheat breads and pastas, oatmeal, brown rice, and bulgur  · Eat a variety of vegetables every day  Include dark, leafy greens such as spinach, kale, denise greens, and mustard greens   Eat yellow and orange vegetables such as carrots, sweet potatoes, and winter squash  · Eat a variety of fruits every day  Choose fresh or canned fruit (canned in its own juice or light syrup) instead of juice  Fruit juice has very little or no fiber  · Eat low-fat dairy foods  Drink fat-free (skim) milk or 1% milk  Eat fat-free yogurt and low-fat cottage cheese  Try low-fat cheeses such as mozzarella and other reduced-fat cheeses  · Choose meat and other protein foods that are low in fat  Choose beans or other legumes such as split peas or lentils  Choose fish, skinless poultry (chicken or turkey), or lean cuts of red meat (beef or pork)  Before you cook meat or poultry, cut off any visible fat  · Use less fat and oil  Try baking foods instead of frying them  Add less fat, such as margarine, sour cream, regular salad dressing and mayonnaise to foods  Eat fewer high-fat foods  Some examples of high-fat foods include french fries, doughnuts, ice cream, and cakes  · Eat fewer sweets  Limit foods and drinks that are high in sugar  This includes candy, cookies, regular soda, and sweetened drinks  Ways to decrease calories:   · Eat smaller portions  ? Use a small plate with smaller servings  ? Do not eat second helpings  ? When you eat at a restaurant, ask for a box and place half of your meal in the box before you eat  ? Share an entrée with someone else  · Replace high-calorie snacks with healthy, low-calorie snacks  ? Choose fresh fruit, vegetables, fat-free rice cakes, or air-popped popcorn instead of potato chips, nuts, or chocolate  ? Choose water or calorie-free drinks instead of soda or sweetened drinks  · Do not shop for groceries when you are hungry  You may be more likely to make unhealthy food choices  Take a grocery list of healthy foods and shop after you have eaten  · Eat regular meals  Do not skip meals  Skipping meals can lead to overeating later in the day   This can make it harder for you to lose weight  Eat a healthy snack in place of a meal if you do not have time to eat a regular meal  Talk with a dietitian to help you create a meal plan and schedule that is right for you  Other things to consider as you try to lose weight:   · Be aware of situations that may give you the urge to overeat, such as eating while watching television  Find ways to avoid these situations  For example, read a book, go for a walk, or do crafts  · Meet with a weight loss support group or friends who are also trying to lose weight  This may help you stay motivated to continue working on your weight loss goals  © Copyright [address] Information is for End User's use only and may not be sold, redistributed or otherwise used for commercial purposes  All illustrations and images included in CareNotes® are the copyrighted property of [employer]  or [facility] Johann Young   The above information is an  only  It is not intended as medical advice for individual conditions or treatments  Talk to your doctor, nurse or pharmacist before following any medical regimen to see if it is safe and effective for you  Low Fat Diet   AMBULATORY CARE:   A low-fat diet  is an eating plan that is low in total fat, unhealthy fat, and cholesterol  You may need to follow a low-fat diet if you have trouble digesting or absorbing fat  You may also need to follow this diet if you have high cholesterol  You can also lower your cholesterol by increasing the amount of fiber in your diet  Soluble fiber is a type of fiber that helps to decrease cholesterol levels  Different types of fat in food:   · Limit unhealthy fats  A diet that is high in cholesterol, saturated fat, and trans fat may cause unhealthy cholesterol levels  Unhealthy cholesterol levels increase your risk of heart disease  ? Cholesterol:  Limit intake of cholesterol to less than 200 mg per day   Cholesterol is found in meat, eggs, and dairy  ? Saturated fat:  Limit saturated fat to less than 7% of your total daily calories  Ask your dietitian how many calories you need each day  Saturated fat is found in butter, cheese, ice cream, whole milk, and palm oil  Saturated fat is also found in meat, such as beef, pork, chicken skin, and processed meats  Processed meats include sausage, hot dogs, and bologna  ? Trans fat:  Avoid trans fat as much as possible  Trans fat is used in fried and baked foods  Foods that say trans fat free on the label may still have up to 0 5 grams of trans fat per serving  · Include healthy fats  Replace foods that are high in saturated and trans fat with foods high in healthy fats  This may help to decrease high cholesterol levels  ? Monounsaturated fats: These are found in avocados, nuts, and vegetable oils, such as olive, canola, and sunflower oil  ? Polyunsaturated fats: These can be found in vegetable oils, such as soybean or corn oil  Omega-3 fats can help to decrease the risk of heart disease  Omega-3 fats are found in fish, such as salmon, herring, trout, and tuna  Omega-3 fats can also be found in plant foods, such as walnuts, flaxseed, soybeans, and canola oil  Foods to limit or avoid:   · Grains:      ? Snacks that are made with partially hydrogenated oils, such as chips, regular crackers, and butter-flavored popcorn    ? High-fat baked goods, such as biscuits, croissants, doughnuts, pies, cookies, and pastries    · Dairy:      ? Whole milk, 2% milk, and yogurt and ice cream made with whole milk    ? Half and half creamer, heavy cream, and whipping cream    ? Cheese, cream cheese, and sour cream    · Meats and proteins:      ? High-fat cuts of meat (T-bone steak, regular hamburger, and ribs)    ? Fried meat, poultry (turkey and chicken), and fish    ? Poultry (chicken and turkey) with skin    ? Cold cuts (salami or bologna), hot dogs, guillory, and sausage    ?  Whole eggs and egg yolks    · Vegetables and fruits with added fat:      ? Fried vegetables or vegetables in butter or high-fat sauces, such as cream or cheese sauces    ? Fried fruit or fruit served with butter or cream    · Fats:      ? Butter, stick margarine, and shortening    ? Coconut, palm oil, and palm kernel oil    Foods to include:   · Grains:      ? Whole-grain breads, cereals, pasta, and brown rice    ? Low-fat crackers and pretzels    · Vegetables and fruits:      ? Fresh, frozen, or canned vegetables (no salt or low-sodium)    ? Fresh, frozen, dried, or canned fruit (canned in light syrup or fruit juice)    ? Avocado    · Low-fat dairy products:      ? Nonfat (skim) or 1% milk    ? Nonfat or low-fat cheese, yogurt, and cottage cheese    · Meats and proteins:      ? Chicken or turkey with no skin    ? Baked or broiled fish    ? Lean beef and pork (loin, round, extra lean hamburger)    ? Beans and peas, unsalted nuts, soy products    ? Egg whites and substitutes    ? Seeds and nuts    · Fats:      ? Unsaturated oil, such as canola, olive, peanut, soybean, or sunflower oil    ? Soft or liquid margarine and vegetable oil spread    ? Low-fat salad dressing    Other ways to decrease fat:   · Read food labels before you buy foods  Choose foods that have less than 30% of calories from fat  Choose low-fat or fat-free dairy products  Remember that fat free does not mean calorie free  These foods still contain calories, and too many calories can lead to weight gain  · Trim fat from meat and avoid fried food  Trim all visible fat from meat before you cook it  Remove the skin from poultry  Do not bonner meat, fish, or poultry  Bake, roast, boil, or broil these foods instead  Avoid fried foods  Eat a baked potato instead of [place] fries  Steam vegetables instead of sautéing them in butter  · Add less fat to foods  Use imitation guillory bits on salads and baked potatoes instead of regular guillory bits   Use fat-free or low-fat salad dressings instead of regular dressings  Use low-fat or nonfat butter-flavored topping instead of regular butter or margarine on popcorn and other foods  Ways to decrease fat in recipes:  Replace high-fat ingredients with low-fat or nonfat ones  This may cause baked goods to be drier than usual  You may need to use nonfat cooking spray on pans to prevent food from sticking  You also may need to change the amount of other ingredients, such as water, in the recipe  Try the following:  · Use low-fat or light margarine instead of regular margarine or shortening  · Use lean ground turkey breast or chicken, or lean ground beef (less than 5% fat) instead of hamburger  · Add 1 teaspoon of canola oil to 8 ounces of skim milk instead of using cream or half and half  · Use grated zucchini, carrots, or apples in breads instead of coconut  · Use blenderized, low-fat cottage cheese, plain tofu, or low-fat ricotta cheese instead of cream cheese  · Use 1 egg white and 1 teaspoon of canola oil, or use ¼ cup (2 ounces) of fat-free egg substitute instead of a whole egg  · Replace half of the oil that is called for in a recipe with applesauce when you bake  Use 3 tablespoons of cocoa powder and 1 tablespoon of canola oil instead of a square of baking chocolate  How to increase fiber:  Eat enough high-fiber foods to get 20 to 30 grams of fiber every day  Slowly increase your fiber intake to avoid stomach cramps, gas, and other problems  · Eat 3 ounces of whole-grain foods each day  An ounce is about 1 slice of bread  Eat whole-grain breads, such as whole-wheat bread  Whole wheat, whole-wheat flour, or other whole grains should be listed as the first ingredient on the food label  Replace white flour with whole-grain flour or use half of each in recipes  Whole-grain flour is heavier than white flour, so you may have to add more yeast or baking powder       · Eat a high-fiber cereal for breakfast   Laura marie good source of soluble fiber  Look for cereals that have bran or fiber in the name  Choose whole-grain products, such as brown rice, barley, and whole-wheat pasta  · Eat more beans, peas, and lentils  For example, add beans to soups or salads  Eat at least 5 cups of fruits and vegetables each day  Eat fruits and vegetables with the peel because the peel is high in fiber  © Copyright [address] Information is for End User's use only and may not be sold, redistributed or otherwise used for commercial purposes  All illustrations and images included in CareNotes® are the copyrighted property of [employer] A M , Inc  or [address] [place] [address]  The above information is an  only  It is not intended as medical advice for individual conditions or treatments  Talk to your doctor, nurse or pharmacist before following any medical regimen to see if it is safe and effective for you  Heart Healthy Diet   AMBULATORY CARE:   A heart healthy diet  is an eating plan low in unhealthy fats and sodium (salt)  The plan is high in healthy fats and fiber  A heart healthy diet helps improve your cholesterol levels and lowers your risk for heart disease and stroke  A dietitian will teach you how to read and understand food labels  Heart healthy diet guidelines to follow:   · Choose foods that contain healthy fats  ? Unsaturated fats  include monounsaturated and polyunsaturated fats  Unsaturated fat is found in foods such as soybean, canola, olive, corn, and safflower oils  It is also found in soft tub margarine that is made with liquid vegetable oil  ? Omega-3 fat  is found in certain fish, such as salmon, tuna, and trout, and in walnuts and flaxseed  Eat fish high in omega-3 fats at least 2 times a week  · Get 20 to 30 grams of fiber each day  Fruits, vegetables, whole-grain foods, and legumes (cooked beans) are good sources of fiber  · Limit or do not have unhealthy fats  ?  Cholesterol is found in animal foods, such as eggs and lobster, and in dairy products made from whole milk  Limit cholesterol to less than 200 mg each day  ? Saturated fat  is found in meats, such as guillory and hamburger  It is also found in chicken or turkey skin, whole milk, and butter  Limit saturated fat to less than 7% of your total daily calories  ? Trans fat  is found in packaged foods, such as potato chips and cookies  It is also in hard margarine, some fried foods, and shortening  Do not eat foods that contain trans fats  · Limit sodium as directed  You may be told to limit sodium to 2,000 to 2,300 mg each day  Choose low-sodium or no-salt-added foods  Add little or no salt to food you prepare  Use herbs and spices in place of salt  Include the following in your heart healthy plan:  Ask your dietitian or healthcare provider how many servings to have from each of the following food groups:  · Grains:      ? Whole-wheat breads, cereals, and pastas, and brown rice    ? Low-fat, low-sodium crackers and chips    · Vegetables:      ? Broccoli, green beans, green peas, and spinach    ? Collards, kale, and lima beans    ? Carrots, sweet potatoes, tomatoes, and peppers    ? Canned vegetables with no salt added    · Fruits:      ? Bananas, peaches, pears, and pineapple    ? Grapes, raisins, and dates    ? Oranges, tangerines, grapefruit, orange juice, and grapefruit juice    ? Apricots, mangoes, melons, and papaya    ? Raspberries and strawberries    ? Canned fruit with no added sugar    · Low-fat dairy:      ? Nonfat (skim) milk, 1% milk, and low-fat almond, cashew, or soy milks fortified with calcium    ? Low-fat cheese, regular or frozen yogurt, and cottage cheese    · Meats and proteins:      ? Lean cuts of beef and pork (loin, leg, round), skinless chicken and turkey    ?  Legumes, soy products, egg whites, or nuts    Limit or do not include the following in your heart healthy plan:   · Unhealthy fats and oils:      ? Whole or 2% milk, cream cheese, sour cream, or cheese    ? High-fat cuts of beef (T-bone steaks, ribs), chicken or turkey with skin, and organ meats such as liver    ? Butter, stick margarine, shortening, and cooking oils such as coconut or palm oil    · Foods and liquids high in sodium:      ? Packaged foods, such as frozen dinners, cookies, macaroni and cheese, and cereals with more than 300 mg of sodium per serving    ? Vegetables with added sodium, such as instant potatoes, vegetables with added sauces, or regular canned vegetables    ? Cured or smoked meats, such as hot dogs, guillory, and sausage    ? High-sodium ketchup, barbecue sauce, salad dressing, pickles, olives, soy sauce, or miso    · Foods and liquids high in sugar:      ? Candy, cake, cookies, pies, or doughnuts    ? Soft drinks (soda), sports drinks, or sweetened tea    ? Canned or dry mixes for cakes, soups, sauces, or gravies    Other healthy heart guidelines:   · Do not smoke  Nicotine and other chemicals in cigarettes and cigars can cause lung and heart damage  Ask your healthcare provider for information if you currently smoke and need help to quit  E-cigarettes or smokeless tobacco still contain nicotine  Talk to your healthcare provider before you use these products  · Limit or do not drink alcohol as directed  Alcohol can damage your heart and raise your blood pressure  Your healthcare provider may give you specific daily and weekly limits  The general recommended limit is 1 drink a day for women 21 or older and for men 72 or older  Do not have more than 3 drinks in a day or 7 in a week  The recommended limit is 2 drinks a day for men 24to 59years of age  Do not have more than 4 drinks in a day or 14 in a week  A drink of alcohol is 12 ounces of beer, 5 ounces of wine, or 1½ ounces of liquor  · Exercise regularly  Exercise can help you maintain a healthy weight and improve your blood pressure and cholesterol levels  Regular exercise can also decrease your risk for heart problems  Ask your healthcare provider about the best exercise plan for you  Do not start an exercise program without asking your healthcare provider  Follow up with your doctor or cardiologist as directed:  Write down your questions so you remember to ask them during your visits  © Copyright [address] Information is for End User's use only and may not be sold, redistributed or otherwise used for commercial purposes  All illustrations and images included in CareNotes® are the copyrighted property of [employer]  or [facility] Johann Young   The above information is an  only  It is not intended as medical advice for individual conditions or treatments  Talk to your doctor, nurse or pharmacist before following any medical regimen to see if it is safe and effective for you

## 2021-07-08 NOTE — ASSESSMENT & PLAN NOTE
Heavy menses were regular periods     Bleeding is heavy making her  Having to change several pads  During the day for the 1st 2 days of the menstruation   currently take ferrous on an every-other-day be vitamin-C daily   will check CBC to check level of anemia    Continue ferrous iron every other day and vitamin-C daily  If anemia persist, will plan on getting a transvaginal ultrasound to evaluate uterine lining   Will also consider hormonal birth control if patient is agreeable

## 2021-07-08 NOTE — ASSESSMENT & PLAN NOTE
Likely iron deficiency anemia secondary to heavy menses     will repeat CBC to evaluate for hemoglobin number and microcytes   iron panel was ordered, advise to have it done

## 2021-07-08 NOTE — ASSESSMENT & PLAN NOTE
Reviewed the causes of heartburn  Discussed importance of diet and lifestyle modifications to control GERD symptoms  Avoid things which worsen heartburn (ex:  caffeine, tomato based products, spicy foods, tobacco, alcohol, obesity, tight fitting clothing )  Discussed importance of eating small, frequent meals instead of large meals    Elevate head of the bed and do not lay down 2-3 hours following a meal      - continue with omeprazole

## 2021-07-08 NOTE — PROGRESS NOTES
Assessment/Plan:    Heavy menses    Heavy menses were regular periods  Bleeding is heavy making her  Having to change several pads  During the day for the 1st 2 days of the menstruation   currently take ferrous on an every-other-day be vitamin-C daily   will check CBC to check level of anemia    Continue ferrous iron every other day and vitamin-C daily  If anemia persist, will plan on getting a transvaginal ultrasound to evaluate uterine lining   Will also consider hormonal birth control if patient is agreeable     Generalized anxiety disorder  Manage by psychiatry  Currently on  Klonopin 0 5mg BID PRN   Will continue to follow along     Gastroesophageal reflux disease  Reviewed the causes of heartburn  Discussed importance of diet and lifestyle modifications to control GERD symptoms  Avoid things which worsen heartburn (ex:  caffeine, tomato based products, spicy foods, tobacco, alcohol, obesity, tight fitting clothing )  Discussed importance of eating small, frequent meals instead of large meals  Elevate head of the bed and do not lay down 2-3 hours following a meal      - continue with omeprazole    Anemia   Likely iron deficiency anemia secondary to heavy menses     will repeat CBC to evaluate for hemoglobin number and microcytes   iron panel was ordered, advise to have it done       Diagnoses and all orders for this visit:    Menorrhagia with regular cycle  -     CBC and differential; Future    BMI 25 0-25 9,adult  -     Lipid Panel with Direct LDL reflex; Future    Need for hepatitis C screening test  -     Hepatitis C antibody; Future    Screening, lipid  -     Lipid Panel with Direct LDL reflex; Future          Subjective:      Patient ID: Donna Chanel is a 39 y o  female  HPI   Donna Chanel is a 39 y o  Very pleasant female  With past medical history of heavy menses, GERD who presented to the office for follow-up of her anemia  Reports that lately she has been feeling more anxious    And recently  Her anxiety has been revolving around her weight  She believes that she lost too much weight and is trying to gain weight  She reports that she takes Ensure daily sometimes twice daily  Reassurance was provided given that her BMI is  25 9  She also said that she has been taking care of her grandson for which she is trying to get guardianship of      she states that for the past couple of months some days she has been really anxious and feeling down for which her daughter has been really helpful  Her  is also to receive supportive and have been cooking for her  She has been seeing her psychiatrist and her therapist which have been really helpful  Currently she reports feeling better with no current symptoms  The following portions of the patient's history were reviewed and updated as appropriate: allergies, current medications, past family history, past medical history, past social history, past surgical history and problem list     Review of Systems   Constitutional: Negative for chills and fever  Respiratory: Negative for cough, chest tightness and shortness of breath  Cardiovascular: Negative for chest pain and leg swelling  Gastrointestinal: Negative for abdominal pain  Genitourinary: Positive for menstrual problem  Neurological: Negative for weakness and numbness  Psychiatric/Behavioral: Negative for self-injury  The patient is nervous/anxious  Objective:      /70 (BP Location: Left arm, Patient Position: Sitting, Cuff Size: Standard)   Pulse 84   Temp 98 4 °F (36 9 °C) (Temporal)   Resp 20   Ht 5' 1" (1 549 m)   Wt 62 2 kg (137 lb 3 2 oz)   SpO2 99%   BMI 25 92 kg/m²          Physical Exam  Constitutional:       General: She is not in acute distress  Appearance: She is well-developed  HENT:      Head: Normocephalic and atraumatic  Cardiovascular:      Rate and Rhythm: Normal rate  Heart sounds: Normal heart sounds  No murmur heard  Pulmonary:      Effort: Pulmonary effort is normal  No respiratory distress  Breath sounds: Normal breath sounds  Abdominal:      General: Bowel sounds are normal  There is no distension  Palpations: Abdomen is soft  Tenderness: There is no abdominal tenderness  There is no guarding  Musculoskeletal:         General: No deformity  Normal range of motion  Cervical back: Neck supple  Skin:     General: Skin is warm and dry  Findings: No rash  Neurological:      Mental Status: She is alert and oriented to person, place, and time  Cranial Nerves: No cranial nerve deficit  99   BMI Counseling: Body mass index is 25 92 kg/m²  The BMI is above normal  Nutrition recommendations include 3-5 servings of fruits/vegetables daily  Exercise recommendations include exercising 3-5 times per week

## 2021-08-25 ENCOUNTER — APPOINTMENT (OUTPATIENT)
Dept: LAB | Facility: HOSPITAL | Age: 46
End: 2021-08-25
Payer: COMMERCIAL

## 2021-08-25 DIAGNOSIS — Z11.59 NEED FOR HEPATITIS C SCREENING TEST: ICD-10-CM

## 2021-08-25 DIAGNOSIS — N92.0 MENORRHAGIA WITH REGULAR CYCLE: ICD-10-CM

## 2021-08-25 DIAGNOSIS — Z13.220 SCREENING, LIPID: ICD-10-CM

## 2021-08-25 LAB
ANISOCYTOSIS BLD QL SMEAR: PRESENT
BASOPHILS # BLD AUTO: 0 THOUSANDS/ΜL (ref 0–0.1)
BASOPHILS NFR BLD AUTO: 1 % (ref 0–1)
EOSINOPHIL # BLD AUTO: 0 THOUSAND/ΜL (ref 0–0.4)
EOSINOPHIL NFR BLD AUTO: 1 % (ref 0–6)
ERYTHROCYTE [DISTWIDTH] IN BLOOD BY AUTOMATED COUNT: 20.9 %
HCT VFR BLD AUTO: 29.1 % (ref 36–46)
HCV AB SER QL: NORMAL
HGB BLD-MCNC: 8.5 G/DL (ref 12–16)
HYPERCHROMIA BLD QL SMEAR: PRESENT
LYMPHOCYTES # BLD AUTO: 1.5 THOUSANDS/ΜL (ref 0.5–4)
LYMPHOCYTES NFR BLD AUTO: 33 % (ref 25–45)
MCH RBC QN AUTO: 17.2 PG (ref 26–34)
MCHC RBC AUTO-ENTMCNC: 29 G/DL (ref 31–36)
MCV RBC AUTO: 59 FL (ref 80–100)
MICROCYTES BLD QL AUTO: PRESENT
MONOCYTES # BLD AUTO: 0.3 THOUSAND/ΜL (ref 0.2–0.9)
MONOCYTES NFR BLD AUTO: 7 % (ref 1–10)
NEUTROPHILS # BLD AUTO: 2.7 THOUSANDS/ΜL (ref 1.8–7.8)
NEUTS SEG NFR BLD AUTO: 59 % (ref 45–65)
PLATELET # BLD AUTO: 246 THOUSANDS/UL (ref 150–450)
PLATELET BLD QL SMEAR: ADEQUATE
PMV BLD AUTO: 9 FL (ref 8.9–12.7)
POIKILOCYTOSIS BLD QL SMEAR: PRESENT
RBC # BLD AUTO: 4.93 MILLION/UL (ref 4–5.2)
RBC MORPH BLD: NORMAL
WBC # BLD AUTO: 4.6 THOUSAND/UL (ref 4.5–11)

## 2021-08-25 PROCEDURE — 36415 COLL VENOUS BLD VENIPUNCTURE: CPT

## 2021-08-25 PROCEDURE — 85025 COMPLETE CBC W/AUTO DIFF WBC: CPT

## 2021-08-25 PROCEDURE — 86803 HEPATITIS C AB TEST: CPT

## 2021-08-26 DIAGNOSIS — N92.0 MENORRHAGIA WITH REGULAR CYCLE: Primary | ICD-10-CM

## 2021-09-13 ENCOUNTER — TELEPHONE (OUTPATIENT)
Dept: FAMILY MEDICINE CLINIC | Facility: CLINIC | Age: 46
End: 2021-09-13

## 2021-09-13 NOTE — TELEPHONE ENCOUNTER
----- Message from Ele Espitia MD sent at 8/26/2021  3:01 PM EDT -----  Regarding: ultrasound  Joi Robles  Could you please help this patient schedule a transvaginal ultrasound and an appointment with GYN?   Thank you

## 2021-09-15 ENCOUNTER — HOSPITAL ENCOUNTER (OUTPATIENT)
Dept: ULTRASOUND IMAGING | Facility: HOSPITAL | Age: 46
Discharge: HOME | End: 2021-09-15
Payer: COMMERCIAL

## 2021-09-15 DIAGNOSIS — N92.0 MENORRHAGIA WITH REGULAR CYCLE: ICD-10-CM

## 2021-09-15 PROCEDURE — 76856 US EXAM PELVIC COMPLETE: CPT

## 2021-09-15 PROCEDURE — 76830 TRANSVAGINAL US NON-OB: CPT

## 2021-09-21 DIAGNOSIS — N92.0 MENORRHAGIA WITH REGULAR CYCLE: Primary | ICD-10-CM

## 2021-09-21 DIAGNOSIS — R93.89 ABNORMAL TRANSVAGINAL ULTRASOUND: ICD-10-CM

## 2021-10-27 DIAGNOSIS — D50.8 OTHER IRON DEFICIENCY ANEMIA: ICD-10-CM

## 2021-10-27 DIAGNOSIS — N92.0 MENORRHAGIA WITH REGULAR CYCLE: ICD-10-CM

## 2021-10-27 RX ORDER — ASCORBIC ACID 250 MG
TABLET ORAL
Qty: 30 TABLET | Refills: 5 | Status: SHIPPED | OUTPATIENT
Start: 2021-10-27 | End: 2022-04-29

## 2022-01-10 ENCOUNTER — OFFICE VISIT (OUTPATIENT)
Dept: FAMILY MEDICINE CLINIC | Facility: CLINIC | Age: 47
End: 2022-01-10

## 2022-01-10 VITALS
BODY MASS INDEX: 26.87 KG/M2 | SYSTOLIC BLOOD PRESSURE: 112 MMHG | WEIGHT: 142.3 LBS | OXYGEN SATURATION: 99 % | HEART RATE: 84 BPM | RESPIRATION RATE: 18 BRPM | DIASTOLIC BLOOD PRESSURE: 68 MMHG | HEIGHT: 61 IN | TEMPERATURE: 97 F

## 2022-01-10 DIAGNOSIS — Z12.31 SCREENING MAMMOGRAM FOR BREAST CANCER: ICD-10-CM

## 2022-01-10 DIAGNOSIS — Z00.00 ANNUAL PHYSICAL EXAM: Primary | ICD-10-CM

## 2022-01-10 DIAGNOSIS — N92.0 MENORRHAGIA WITH REGULAR CYCLE: ICD-10-CM

## 2022-01-10 PROCEDURE — 99396 PREV VISIT EST AGE 40-64: CPT | Performed by: FAMILY MEDICINE

## 2022-01-10 NOTE — PROGRESS NOTES
[address] [facility]    NAME: Juancarlos Estrella  AGE: 42 y o  SEX: female  : 1975     DATE: 1/10/2022     Assessment and Plan:     Problem List Items Addressed This Visit        Other    Annual physical exam - Primary     Annual physical  Normal exam      - will get CBC, TSH given her complaint of fatigue   - will order screening mammogram  to be completed after April,   - up-to-date on Pap smear, last Pap was normal in  any, will repeat another Pap in          Heavy menses       Has been regular now, and bleeding has been lighter   Continue with 1st on an every other day and vitamin-C daily  Will repeat CBC to evaluate level of hemoglobin currently         Relevant Orders    CBC and differential    TSH, 3rd generation with Free T4 reflex      Other Visit Diagnoses     Screening mammogram for breast cancer        Relevant Orders    Mammo screening bilateral w 3d & cad          Immunizations and preventive care screenings were discussed with patient today  Appropriate education was printed on patient's after visit summary  Counseling:  Alcohol/drug use: discussed moderation in alcohol intake, the recommendations for healthy alcohol use, and avoidance of illicit drug use  Sexual health: discussed sexually transmitted diseases, partner selection, use of condoms, avoidance of unintended pregnancy, and contraceptive alternatives  · Exercise: the importance of regular exercise/physical activity was discussed  Recommend exercise 3-5 times per week for at least 30 minutes  Return in about 3 months (around 4/10/2022) for anemia  Chief Complaint:     Chief Complaint   Patient presents with    Physical Exam     41 y/o       History of Present Illness:     Adult Annual Physical   Patient here for a comprehensive physical exam  The patient reports no problems      Diet and Physical Activity  · Diet/Nutrition: well balanced diet  · Exercise: no formal exercise  Depression Screening  PHQ-2/9 Depression Screening    Little interest or pleasure in doing things: 0 - not at all  Feeling down, depressed, or hopeless: 0 - not at all  PHQ-2 Score: 0  PHQ-2 Interpretation: Negative depression screen       General Health  · Sleep: sleeps well and gets 4-6 hours of sleep on average  · Hearing: normal - bilateral   · Vision: wears glasses  · Dental: regular dental visits  /GYN Health  · Patient is: premenauposal   · Last menstrual period: 2022  · Contraceptive method: barrier methods       Review of Systems:     Review of Systems   Past Medical History:     Past Medical History:   Diagnosis Date    Anxiety       Past Surgical History:     Past Surgical History:   Procedure Laterality Date     SECTION      DILATION AND CURETTAGE OF UTERUS        Social History:     Social History     Socioeconomic History    Marital status: /Civil Union     Spouse name: None    Number of children: None    Years of education: None    Highest education level: None   Occupational History    None   Tobacco Use    Smoking status: Never Smoker    Smokeless tobacco: Never Used   Vaping Use    Vaping Use: Never used   Substance and Sexual Activity    Alcohol use: Not Currently     Comment: occasional wine    Drug use: No    Sexual activity: None   Other Topics Concern    None   Social History Narrative    None     Social Determinants of Health     Financial Resource Strain: Not on file   Food Insecurity: Not on file   Transportation Needs: Not on file   Physical Activity: Not on file   Stress: Not on file   Social Connections: Not on file   Intimate Partner Violence: Not on file   Housing Stability: Not on file      Family History:     Family History   Problem Relation Age of Onset    Hypertension Mother     Hypertension Sister     Diabetes Maternal Grandmother     Colon cancer Maternal Grandfather 80    Diabetes Maternal Aunt     No Known Problems Father     No Known Problems Daughter     No Known Problems Paternal Grandmother     No Known Problems Paternal Grandfather     No Known Problems Sister     No Known Problems Son     No Known Problems Son     No Known Problems Son     No Known Problems Daughter     No Known Problems Daughter     Multiple myeloma Maternal Aunt     Cervical cancer Maternal Aunt       Current Medications:     Current Outpatient Medications   Medication Sig Dispense Refill    clonazePAM (KlonoPIN) 0 5 mg tablet Take 0 5 mg by mouth 2 (two) times a day as needed for seizures      [facility] Vitamin C 250 MG tablet TAKE 1 TABLET BY MOUTH EVERY DAY 30 tablet 5    ferrous sulfate 324 (65 Fe) mg TAKE 1 TABLET (324 MG TOTAL) BY MOUTH EVERY OTHER DAY 30 tablet 5    omeprazole (PriLOSEC) 40 MG capsule Take 1 capsule (40 mg total) by mouth daily      menthol-cetylpyridinium (CEPACOL) 3 MG lozenge Take 1 lozenge (3 mg total) by mouth as needed for sore throat (Patient not taking: Reported on 6/4/2021) 30 lozenge 1     No current facility-administered medications for this visit  Allergies:      Allergies   Allergen Reactions    Amoxicillin Rash    Penicillins Rash      Physical Exam:     /68 (BP Location: Left arm, Patient Position: Sitting, Cuff Size: Adult)   Pulse 84   Temp (!) 97 °F (36 1 °C) (Temporal)   Resp 18   Ht 5' 1" (1 549 m)   Wt 64 5 kg (142 lb 4 8 oz)   LMP 01/03/2022 (Exact Date)   SpO2 99%   BMI 26 89 kg/m²     Physical Exam     MD Osmar Albright

## 2022-01-10 NOTE — ASSESSMENT & PLAN NOTE
Has been regular now, and bleeding has been lighter   Continue with 1st on an every other day and vitamin-C daily  Will repeat CBC to evaluate level of hemoglobin currently

## 2022-01-10 NOTE — ASSESSMENT & PLAN NOTE
Annual physical  Normal exam      - will get CBC, TSH given her complaint of fatigue   - will order screening mammogram  to be completed after April,   - up-to-date on Pap smear, last Pap was normal in 2020 any, will repeat another Pap in 2023

## 2022-01-10 NOTE — PATIENT INSTRUCTIONS
Anemia   WHAT YOU NEED TO KNOW:   Anemia is a low number of red blood cells or a low amount of hemoglobin in your red blood cells  Hemoglobin is a protein that helps carry oxygen throughout your body  Red blood cells use iron to create hemoglobin  Anemia may develop if your body does not have enough iron  It may also develop if your body does not make enough red blood cells or they die faster than your body can make them  DISCHARGE INSTRUCTIONS:   Call 911 or have someone call 911 for any of the following:   · You lose consciousness  · You have severe chest pain  Return to the emergency department if:   · You have dark or bloody bowel movements  Contact your healthcare provider if:   · Your symptoms are worse, even after treatment  · You have questions or concerns about your condition or care  Medicines:   · Iron or folic acid supplements  help increase your red blood cell and hemoglobin levels  · Vitamin B12 injections  may help boost your red blood cell level and decrease your symptoms  Ask your healthcare provider how to inject B12  · Take your medicine as directed  Contact your healthcare provider if you think your medicine is not helping or if you have side effects  Tell him of her if you are allergic to any medicine  Keep a list of the medicines, vitamins, and herbs you take  Include the amounts, and when and why you take them  Bring the list or the pill bottles to follow-up visits  Carry your medicine list with you in case of an emergency  Prevent anemia:  Eat healthy foods rich in iron and vitamin C  Nuts, meat, dark leafy green vegetables, and beans are high in iron and protein  Vitamin C helps your body absorb iron  Foods rich in vitamin C include oranges and other citrus fruits  Ask your healthcare provider for a list of other foods that are high in iron or vitamin C  Ask if you need to be on a special diet            Follow up with your doctor as directed:  Write down your questions so you remember to ask them during your visits  © Copyright [employer] 2021 Information is for End User's use only and may not be sold, redistributed or otherwise used for commercial purposes  All illustrations and images included in CareNotes® are the copyrighted property of A D A M , Inc  or [employer]  The above information is an  only  It is not intended as medical advice for individual conditions or treatments  Talk to your doctor, nurse or pharmacist before following any medical regimen to see if it is safe and effective for you

## 2022-04-18 ENCOUNTER — APPOINTMENT (OUTPATIENT)
Dept: LAB | Facility: CLINIC | Age: 47
End: 2022-04-18
Payer: COMMERCIAL

## 2022-04-18 ENCOUNTER — OFFICE VISIT (OUTPATIENT)
Dept: FAMILY MEDICINE CLINIC | Facility: CLINIC | Age: 47
End: 2022-04-18

## 2022-04-18 VITALS
BODY MASS INDEX: 27.75 KG/M2 | SYSTOLIC BLOOD PRESSURE: 102 MMHG | TEMPERATURE: 97.4 F | HEART RATE: 73 BPM | OXYGEN SATURATION: 99 % | DIASTOLIC BLOOD PRESSURE: 84 MMHG | RESPIRATION RATE: 18 BRPM | WEIGHT: 147 LBS | HEIGHT: 61 IN

## 2022-04-18 DIAGNOSIS — Z30.09 BIRTH CONTROL COUNSELING: ICD-10-CM

## 2022-04-18 DIAGNOSIS — D50.8 OTHER IRON DEFICIENCY ANEMIA: ICD-10-CM

## 2022-04-18 DIAGNOSIS — R23.8 EASY BRUISING: ICD-10-CM

## 2022-04-18 DIAGNOSIS — K21.9 GASTROESOPHAGEAL REFLUX DISEASE WITHOUT ESOPHAGITIS: ICD-10-CM

## 2022-04-18 DIAGNOSIS — N92.0 MENORRHAGIA WITH REGULAR CYCLE: ICD-10-CM

## 2022-04-18 DIAGNOSIS — N92.0 MENORRHAGIA WITH REGULAR CYCLE: Primary | ICD-10-CM

## 2022-04-18 PROBLEM — R23.3 EASY BRUISING: Status: ACTIVE | Noted: 2022-04-18

## 2022-04-18 LAB
APTT PPP: 29 SECONDS (ref 23–37)
BASOPHILS # BLD AUTO: 0.03 THOUSANDS/ΜL (ref 0–0.1)
BASOPHILS NFR BLD AUTO: 1 % (ref 0–1)
CHOLEST SERPL-MCNC: 137 MG/DL
EOSINOPHIL # BLD AUTO: 0.02 THOUSAND/ΜL (ref 0–0.61)
EOSINOPHIL NFR BLD AUTO: 0 % (ref 0–6)
ERYTHROCYTE [DISTWIDTH] IN BLOOD BY AUTOMATED COUNT: 21 % (ref 11.6–15.1)
FERRITIN SERPL-MCNC: 3 NG/ML (ref 8–388)
HCT VFR BLD AUTO: 32.5 % (ref 34.8–46.1)
HDLC SERPL-MCNC: 59 MG/DL
HGB BLD-MCNC: 8.7 G/DL (ref 11.5–15.4)
IMM GRANULOCYTES # BLD AUTO: 0 THOUSAND/UL (ref 0–0.2)
IMM GRANULOCYTES NFR BLD AUTO: 0 % (ref 0–2)
INR PPP: 1.07 (ref 0.84–1.19)
IRON SATN MFR SERPL: 4 % (ref 15–50)
IRON SERPL-MCNC: 16 UG/DL (ref 50–170)
LDLC SERPL CALC-MCNC: 67 MG/DL (ref 0–100)
LYMPHOCYTES # BLD AUTO: 1.52 THOUSANDS/ΜL (ref 0.6–4.47)
LYMPHOCYTES NFR BLD AUTO: 34 % (ref 14–44)
MCH RBC QN AUTO: 17.4 PG (ref 26.8–34.3)
MCHC RBC AUTO-ENTMCNC: 26.8 G/DL (ref 31.4–37.4)
MCV RBC AUTO: 65 FL (ref 82–98)
MONOCYTES # BLD AUTO: 0.38 THOUSAND/ΜL (ref 0.17–1.22)
MONOCYTES NFR BLD AUTO: 9 % (ref 4–12)
NEUTROPHILS # BLD AUTO: 2.53 THOUSANDS/ΜL (ref 1.85–7.62)
NEUTS SEG NFR BLD AUTO: 56 % (ref 43–75)
NRBC BLD AUTO-RTO: 0 /100 WBCS
PLATELET # BLD AUTO: 342 THOUSANDS/UL (ref 149–390)
PROTHROMBIN TIME: 13.5 SECONDS (ref 11.6–14.5)
RBC # BLD AUTO: 5.01 MILLION/UL (ref 3.81–5.12)
SL AMB POCT URINE HCG: NEGATIVE
TIBC SERPL-MCNC: 439 UG/DL (ref 250–450)
TRIGL SERPL-MCNC: 57 MG/DL
TSH SERPL DL<=0.05 MIU/L-ACNC: 1.31 UIU/ML (ref 0.45–4.5)
WBC # BLD AUTO: 4.48 THOUSAND/UL (ref 4.31–10.16)

## 2022-04-18 PROCEDURE — 36415 COLL VENOUS BLD VENIPUNCTURE: CPT

## 2022-04-18 PROCEDURE — 85730 THROMBOPLASTIN TIME PARTIAL: CPT

## 2022-04-18 PROCEDURE — 84443 ASSAY THYROID STIM HORMONE: CPT

## 2022-04-18 PROCEDURE — 85610 PROTHROMBIN TIME: CPT

## 2022-04-18 PROCEDURE — 85025 COMPLETE CBC W/AUTO DIFF WBC: CPT

## 2022-04-18 PROCEDURE — 81025 URINE PREGNANCY TEST: CPT | Performed by: INTERNAL MEDICINE

## 2022-04-18 PROCEDURE — 80061 LIPID PANEL: CPT

## 2022-04-18 PROCEDURE — 83550 IRON BINDING TEST: CPT

## 2022-04-18 PROCEDURE — 99213 OFFICE O/P EST LOW 20 MIN: CPT | Performed by: INTERNAL MEDICINE

## 2022-04-18 PROCEDURE — 82728 ASSAY OF FERRITIN: CPT

## 2022-04-18 PROCEDURE — 83540 ASSAY OF IRON: CPT

## 2022-04-18 RX ORDER — NORETHINDRONE ACETATE AND ETHINYL ESTRADIOL 1MG-20(21)
1 KIT ORAL DAILY
Qty: 84 TABLET | Refills: 3 | Status: SHIPPED | OUTPATIENT
Start: 2022-04-18

## 2022-04-18 NOTE — LETTER
April 18, 2022     Patient: Andi Cazares  YOB: 1975  Date of Visit: 4/18/2022      To Whom it May Concern:    Andi Cazares is under my professional care  Machelle Carcamo was seen in my office on 4/18/2022I have been her doctor for a while  She is healthy and is able to provide care for her family and any new addition to the family     If you have any questions or concerns, please don't hesitate to call           Sincerely,          Tanisha Riggs MD  04/18/22

## 2022-04-18 NOTE — ASSESSMENT & PLAN NOTE
Regular menses with menorrhagia,   Periods  Last for 5 days with heavy 3 first days  LMP 04/07/22  Pregnancy test negative in office   amenable to try  OCP, will send to the pharmacy [facility]  Oral contraceptive  - continue with iron supplement  I encouraged to do the lab work that was previously ordered,  If hemoglobin remain low despite the iron supplement, will consider starting patient on Venofer infusion

## 2022-04-18 NOTE — PATIENT INSTRUCTIONS
Birth Control Pills   AMBULATORY CARE:   Birth control pills  are also called oral contraceptives, or the pill  It is medicine that helps prevent pregnancy by stopping ovulation  Ovulation is when the ovaries make and release an egg cell each month  If this egg gets fertilized by sperm, pregnancy occurs  You will need to take the pill at the same time every day  Your healthcare provider will tell you when to start taking the pill  You will also be told what to do if you miss a dose  Instructions will depend on the kind of birth control pills you are taking  Different kinds of birth control pills:  Some kinds are taken for 21 days in a row, followed by 7 days of placebo (no hormones) pills  Other kinds are taken for 24 days followed by 4 days of placebos  Each kind has a certain amount of female hormones  Your provider will decide on the kind that is best for you based on your age and other health conditions  Call your local emergency number (911 in the [address] [facility]) for any of the following:   · You have any of the following signs of a stroke:      ? Numbness or drooping on one side of your face     ? Weakness in an arm or leg    ? Confusion or difficulty speaking    ? Dizziness, a severe headache, or vision loss    · You feel lightheaded, short of breath, and have chest pain  · You cough up blood  Seek care immediately if:   · Your arm or leg feels warm, tender, and painful  It may look swollen and red  · You have severe pain, numbness, or swelling in your arms or legs  Contact your healthcare provider if:   · You have forgotten to take a birth control pill  · You have mood changes, such as depression, since starting birth control pills  · You have nausea or are vomiting  · You have severe abdominal pain  · You missed a period and have questions or concerns about being pregnant  · You still have bleeding 4 months after taking birth control pills correctly      · You have questions or concerns about your condition or care  What may be done before you can start taking birth control pills: You need to see your healthcare provider to get a prescription  Any of the following may be done before your healthcare provider gives you a prescription:  · Your healthcare provider will ask about diseases and illnesses you have had in the past  Your provider will check your risk for blood clots, heart conditions, or stroke  Tell your provider if you had gastric bypass surgery  This surgery can affect the way your body absorbs medicines such as birth control pills  · Your provider will also check your blood pressure, and may do a breast and pelvic exam  A Pap smear may also be done during the pelvic exam  This is a test to make sure you do not have abnormal changes on your cervix  You may need other tests, such as a urine test to make sure you are not pregnant  · Your provider will ask if you take any medicines and if you smoke  Smoking increases your risk for stroke, heart attack, or a blood clot in your lungs  If you smoke, you should not take certain kinds of birth control pills  Advantages of birth control pills:  When birth control pills are used correctly, the chances of getting pregnant are very low  Birth control pills may help decrease bleeding and pain during your monthly period  They may also help prevent cancer of the uterus and ovaries  Disadvantages of birth control pills: You may have sudden changes in your mood or feelings while you take birth control pills  You may have nausea and a decreased sex drive  You may have an increased appetite and rapid weight gain  You may also have bleeding in between periods, less frequent periods, vaginal dryness, and breast pain  Birth control pills will not protect you from sexually transmitted infections  Rarely, some birth control pills can increase your risk for a blood clot  This may become life-threatening  If you decide you want to get pregnant:   If you are planning to have a baby, ask your healthcare provider when you may stop taking your birth control pills  It may take some time for you to start ovulating again  Ask your healthcare provider for more information about pregnancy after birth control pills  When to start taking birth control pills after you have a baby: If you are not breastfeeding, you may start taking birth control pills 3 weeks after you give birth  You may be able to take certain types of birth control pills if you are breastfeeding  These pills can be started from 6 weeks to 6 months after you give birth  Ask your healthcare provider for more information about when to start taking birth control pills after you give birth  What you need to know about birth control pills and menopause:   · Talk with your healthcare provider if you want to take birth control pills around menopause  · Around age 39, you will enter into perimenopause  This means your hormone levels are dropping and you are ovulating less often  You can still become pregnant during this time  The risk for problems, such as miscarriage, are higher if you become pregnant after age 39  Birth control pills will prevent pregnancy, and may also help prevent or relieve some signs and symptoms of menopause  Examples are hot flashes and mood swings  · Your provider will do tests when you are around age 48  The tests may show that you are in menopause  If the tests do not show menopause for sure, you may be able to continue taking the pill up to age 54  The decision will depend on your health and if you have any medical conditions, such as a blood clot  Do not smoke:  Nicotine and other chemicals in cigarettes and cigars increase your risk for a blood clot while you use birth control pills  The risk is higher if you are also 35 or older  Ask your healthcare provider for information if you currently smoke and need help to quit   E-cigarettes or smokeless tobacco still contain nicotine  Talk to your healthcare provider before you use these products  Follow up with your healthcare provider as directed:  Write down your questions so you remember to ask them during your visits  © Copyright [employer] 2022 Information is for End User's use only and may not be sold, redistributed or otherwise used for commercial purposes  All illustrations and images included in CareNotes® are the copyrighted property of A D A M , Inc  or [facility] [employer]   The above information is an  only  It is not intended as medical advice for individual conditions or treatments  Talk to your doctor, nurse or pharmacist before following any medical regimen to see if it is safe and effective for you

## 2022-04-18 NOTE — PROGRESS NOTES
Assessment/Plan:    Heavy menses    Regular menses with menorrhagia,   Periods  Last for 5 days with heavy 3 first days  LMP 04/07/22  Pregnancy test negative in office   amenable to try  OCP, will send to the pharmacy [facility]  Oral contraceptive  - continue with iron supplement  I encouraged to do the lab work that was previously ordered,  If hemoglobin remain low despite the iron supplement, will consider starting patient on Venofer infusion    Gastroesophageal reflux disease   Managed with omeprazole 40 mg daily, this is working well, will continue    Easy bruising   Easy bruising not associated with any nosebleed   Will recheck CBC, will add in PT/ INR, and PTT       Diagnoses and all orders for this visit:    Menorrhagia with regular cycle  -     norethindrone-ethinyl estradiol ([facility] FE 1/20) 1-20 MG-MCG per tablet; Take 1 tablet by mouth daily  -     Iron Panel (Includes Ferritin, Iron Sat%, Iron, and TIBC); Future    Birth control counseling  -     norethindrone-ethinyl estradiol ([facility] FE 1/20) 1-20 MG-MCG per tablet; Take 1 tablet by mouth daily  -     POCT urine HCG    Easy bruising  -     Protime-INR; Future  -     APTT; Future    Other iron deficiency anemia  -     Iron Panel (Includes Ferritin, Iron Sat%, Iron, and TIBC); Future    Gastroesophageal reflux disease without esophagitis          Subjective:      Patient ID: Sunny Barone is a 42 y o  female  HPI  Sunny Barone is a 42 y o  female with history of iron def anemia, And anxiety who presented office for follow-up  Patient reports that she is feeling well she stated that sometimes she feels more fatigued however overall she is feeling much better than before  Denies any chest pain, shortness of breath, lightheadedness  Reports that her  Continue to be heavy  Lasting about 4-5 days with heavy 3 days  She did explain that she feels she bruised a lot easily now where over she bump up her body into something  Denies any nose bleed      The following portions of the patient's history were reviewed and updated as appropriate: allergies, current medications, past family history, past medical history, past social history, past surgical history and problem list     Review of Systems   Constitutional: Negative for chills and fever  HENT: Negative for ear pain, nosebleeds and sore throat  Eyes: Negative for pain and visual disturbance  Respiratory: Negative for cough and shortness of breath  Cardiovascular: Negative for chest pain and palpitations  Gastrointestinal: Negative for abdominal pain and vomiting  Genitourinary: Positive for menstrual problem  Negative for dysuria and hematuria  Musculoskeletal: Negative for arthralgias and back pain  Skin: Negative for color change and rash  Neurological: Negative for dizziness, seizures, syncope, weakness and light-headedness  All other systems reviewed and are negative  Objective:      /84 (BP Location: Left arm, Patient Position: Sitting, Cuff Size: Adult)   Pulse 73   Temp (!) 97 4 °F (36 3 °C) (Temporal)   Resp 18   Ht 5' 1" (1 549 m)   Wt 66 7 kg (147 lb)   LMP 04/06/2022   SpO2 99%   BMI 27 78 kg/m²          Physical Exam  Constitutional:       General: She is not in acute distress  Appearance: She is well-developed  HENT:      Head: Normocephalic and atraumatic  Eyes:      General: No scleral icterus  Conjunctiva/sclera: Conjunctivae normal    Cardiovascular:      Rate and Rhythm: Normal rate  Heart sounds: Normal heart sounds  No murmur heard  Pulmonary:      Effort: Pulmonary effort is normal  No respiratory distress  Breath sounds: Normal breath sounds  Abdominal:      General: Bowel sounds are normal       Palpations: Abdomen is soft  Tenderness: There is no abdominal tenderness  Musculoskeletal:         General: No deformity  Normal range of motion  Cervical back: Neck supple  Skin:     General: Skin is warm and dry  Findings: Bruising (2 bruising near left outer thigh) present  No rash  Neurological:      Mental Status: She is alert and oriented to person, place, and time  Cranial Nerves: No cranial nerve deficit  Body mass index is 27 78 kg/m²  BMI Counseling: Body mass index is 27 78 kg/m²  The BMI is above normal  Nutrition recommendations include reducing portion sizes, decreasing overall calorie intake and 3-5 servings of fruits/vegetables daily  Exercise recommendations include exercising 3-5 times per week and strength training exercises

## 2022-05-19 ENCOUNTER — TELEPHONE (OUTPATIENT)
Dept: FAMILY MEDICINE CLINIC | Facility: CLINIC | Age: 47
End: 2022-05-19

## 2022-05-27 DIAGNOSIS — D50.8 OTHER IRON DEFICIENCY ANEMIA: ICD-10-CM

## 2022-05-27 DIAGNOSIS — N92.0 MENORRHAGIA WITH REGULAR CYCLE: ICD-10-CM

## 2022-05-31 RX ORDER — FERROUS SULFATE TAB EC 324 MG (65 MG FE EQUIVALENT) 324 (65 FE) MG
324 TABLET DELAYED RESPONSE ORAL EVERY OTHER DAY
Qty: 15 TABLET | Refills: 11 | Status: SHIPPED | OUTPATIENT
Start: 2022-05-31

## 2022-06-10 ENCOUNTER — HOSPITAL ENCOUNTER (OUTPATIENT)
Dept: MAMMOGRAPHY | Facility: CLINIC | Age: 47
Discharge: HOME | End: 2022-06-10
Payer: COMMERCIAL

## 2022-06-10 DIAGNOSIS — Z12.31 SCREENING MAMMOGRAM FOR BREAST CANCER: ICD-10-CM

## 2022-06-10 PROCEDURE — 77063 BREAST TOMOSYNTHESIS BI: CPT

## 2022-06-10 PROCEDURE — 77067 SCR MAMMO BI INCL CAD: CPT

## 2022-06-10 NOTE — TELEPHONE ENCOUNTER
Adoption & Foster care form received on 6/10/22  to be completed by PCP  Copy made and placed in PCP folder  Forms to be delivered to PCP mailbox at assigned time        *form need to be re-completed, the previous form was missing the front page

## 2022-06-12 DIAGNOSIS — N92.0 MENORRHAGIA WITH REGULAR CYCLE: ICD-10-CM

## 2022-06-12 DIAGNOSIS — D50.8 OTHER IRON DEFICIENCY ANEMIA: ICD-10-CM

## 2022-06-14 RX ORDER — MULTIVIT WITH MINERALS/LUTEIN
TABLET ORAL
Qty: 30 TABLET | Refills: 1 | Status: SHIPPED | OUTPATIENT
Start: 2022-06-14

## 2022-06-23 ENCOUNTER — APPOINTMENT (OUTPATIENT)
Dept: LAB | Facility: CLINIC | Age: 47
End: 2022-06-23
Payer: COMMERCIAL

## 2022-06-23 ENCOUNTER — OFFICE VISIT (OUTPATIENT)
Dept: FAMILY MEDICINE CLINIC | Facility: CLINIC | Age: 47
End: 2022-06-23

## 2022-06-23 VITALS
HEIGHT: 61 IN | HEART RATE: 78 BPM | OXYGEN SATURATION: 99 % | RESPIRATION RATE: 18 BRPM | DIASTOLIC BLOOD PRESSURE: 80 MMHG | WEIGHT: 149.8 LBS | TEMPERATURE: 96.2 F | SYSTOLIC BLOOD PRESSURE: 138 MMHG | BODY MASS INDEX: 28.28 KG/M2

## 2022-06-23 DIAGNOSIS — D64.9 ANEMIA, UNSPECIFIED TYPE: ICD-10-CM

## 2022-06-23 DIAGNOSIS — R19.8 IRREGULAR BOWEL HABITS: ICD-10-CM

## 2022-06-23 DIAGNOSIS — R14.0 BLOATING: ICD-10-CM

## 2022-06-23 DIAGNOSIS — Z12.11 ENCOUNTER FOR SCREENING COLONOSCOPY: ICD-10-CM

## 2022-06-23 DIAGNOSIS — N92.0 MENORRHAGIA WITH REGULAR CYCLE: ICD-10-CM

## 2022-06-23 DIAGNOSIS — K21.9 GASTROESOPHAGEAL REFLUX DISEASE WITHOUT ESOPHAGITIS: ICD-10-CM

## 2022-06-23 DIAGNOSIS — F41.1 GENERALIZED ANXIETY DISORDER: ICD-10-CM

## 2022-06-23 DIAGNOSIS — D64.9 ANEMIA, UNSPECIFIED TYPE: Primary | ICD-10-CM

## 2022-06-23 LAB
BASOPHILS # BLD AUTO: 0.04 THOUSANDS/ΜL (ref 0–0.1)
BASOPHILS NFR BLD AUTO: 1 % (ref 0–1)
EOSINOPHIL # BLD AUTO: 0.01 THOUSAND/ΜL (ref 0–0.61)
EOSINOPHIL NFR BLD AUTO: 0 % (ref 0–6)
ERYTHROCYTE [DISTWIDTH] IN BLOOD BY AUTOMATED COUNT: 22.3 % (ref 11.6–15.1)
HCT VFR BLD AUTO: 37.7 % (ref 34.8–46.1)
HGB BLD-MCNC: 10.1 G/DL (ref 11.5–15.4)
IMM GRANULOCYTES # BLD AUTO: 0.01 THOUSAND/UL (ref 0–0.2)
IMM GRANULOCYTES NFR BLD AUTO: 0 % (ref 0–2)
LYMPHOCYTES # BLD AUTO: 1.63 THOUSANDS/ΜL (ref 0.6–4.47)
LYMPHOCYTES NFR BLD AUTO: 34 % (ref 14–44)
MCH RBC QN AUTO: 17.5 PG (ref 26.8–34.3)
MCHC RBC AUTO-ENTMCNC: 26.8 G/DL (ref 31.4–37.4)
MCV RBC AUTO: 65 FL (ref 82–98)
MONOCYTES # BLD AUTO: 0.41 THOUSAND/ΜL (ref 0.17–1.22)
MONOCYTES NFR BLD AUTO: 9 % (ref 4–12)
NEUTROPHILS # BLD AUTO: 2.71 THOUSANDS/ΜL (ref 1.85–7.62)
NEUTS SEG NFR BLD AUTO: 56 % (ref 43–75)
NRBC BLD AUTO-RTO: 0 /100 WBCS
PLATELET # BLD AUTO: 266 THOUSANDS/UL (ref 149–390)
RBC # BLD AUTO: 5.78 MILLION/UL (ref 3.81–5.12)
WBC # BLD AUTO: 4.81 THOUSAND/UL (ref 4.31–10.16)

## 2022-06-23 PROCEDURE — 83516 IMMUNOASSAY NONANTIBODY: CPT

## 2022-06-23 PROCEDURE — 36415 COLL VENOUS BLD VENIPUNCTURE: CPT

## 2022-06-23 PROCEDURE — 99213 OFFICE O/P EST LOW 20 MIN: CPT | Performed by: FAMILY MEDICINE

## 2022-06-23 PROCEDURE — 85025 COMPLETE CBC W/AUTO DIFF WBC: CPT

## 2022-06-23 NOTE — PROGRESS NOTES
Assessment/Plan:    Anemia  -much improved menses after starting OCP's medication  -no recent bleeding episodes reported  -on oral iron supplementation and vitamin C   -declines option for consider IV iron infusions  -recheck CBC to monitor Hgb levels  -ED precautions if sudden onset bleeding    Generalized anxiety disorder  -following with therapists  -current medication: klonopin 0 5 mg BID PRN  -working closely with therapists to potentially 'wean off' medication  -maintains busy lifestyle--> works 3 jobs and good distractors    Heavy menses  -improvement in menses since starting OCP's  -tolerating well without complications  -improvement in cramps  -continue current treatment and follow up with PCP  -continue iron supplementation  -f/u CBC    Irregular bowel habits  -symptoms of loose stools, constipation at times  -anxiety tends to worsen symptoms   -some bloating sensation/ gas sensation at times as well  -discussed with patient about potential etiologies for symptoms, including : IBS vs anxiety vs lactose intolerance vs gluten intolerance  -recommendations of: trial off lactose, off gluten foods (flour, breads, etc)  -patient agreeable for Gluten sensitivity testing  -can consider also potential medication of bentyl for gas relief as well     Encounter for screening colonoscopy  -fmhx of colon cancer grandfather  -due for screening colonoscopy soon at 40 y/o   agreeable for GI referral and placed       Diagnoses and all orders for this visit:    Anemia, unspecified type  -     CBC and differential; Future    Gastroesophageal reflux disease without esophagitis  -     Ambulatory Referral to Gastroenterology; Future    Bloating  -     Ambulatory Referral to Gastroenterology; Future  -     Gluten Sensitivity Screen; Future    Encounter for screening colonoscopy  -     Ambulatory Referral to Gastroenterology; Future    Irregular bowel habits  -     Ambulatory Referral to Gastroenterology;  Future    Generalized anxiety disorder    Menorrhagia with regular cycle          Subjective:      Patient ID: Evan Morataya is a 42 y o  female  Case of 41 y/o female who came today for follow up  She indicates that her periods have been much better controlled after starting OCP's  She has been experiencing less cramps, blood flow has improved as well and noticed improvement in energy  No blood loss episodes reported  She reports to have recurrent gas sensation, has anxiety which at times relates with worsening of symptoms, and can also cause some diarrhea and loose stools as well  Can also have episodes of constipation as well   takes PPI--> helps for reflux  Bloating sensation---> not related to any specific type of foods    Reports hx of colon cancer in grandfather, aunt also with colon issues as well, and is wondering when can have colonoscopy screening since nearing 42 y/o soon  No blood in stool noted    She reports leaving for road trip to [facility] soon  No blood in stool    Takes klonopin for anxiety, following with therapists--> working for [employer] off medication, taking for years  At work--> busy at work, doing good with all of them, works 3 jobs and states are good distractions for anxiety    Anemia--> does not want IV iron at this time, recheck CBC  No prior blood transfusions in the past  Had  and lost a lot of blood--> no transfusion required      The following portions of the patient's history were reviewed and updated as appropriate: allergies, current medications, past family history, past medical history, past social history, past surgical history and problem list     Review of Systems   Constitutional: Negative for fever  Respiratory: Negative for cough, shortness of breath and wheezing  Cardiovascular: Negative for chest pain, palpitations and leg swelling  Gastrointestinal: Positive for constipation and diarrhea  Negative for blood in stool          Bloating, gas sensation   Skin: Negative  Neurological: Negative for headaches  Psychiatric/Behavioral: The patient is not nervous/anxious  Objective:      /80 (BP Location: Left arm, Patient Position: Sitting, Cuff Size: Standard)   Pulse 78   Temp (!) 96 2 °F (35 7 °C) (Temporal)   Resp 18   Ht 5' 1" (1 549 m)   Wt 67 9 kg (149 lb 12 8 oz)   LMP 06/09/2022 (Exact Date)   SpO2 99%   BMI 28 30 kg/m²          Physical Exam  Vitals reviewed  Constitutional:       General: She is not in acute distress  Appearance: Normal appearance  She is not ill-appearing, toxic-appearing or diaphoretic  Eyes:      Extraocular Movements: Extraocular movements intact  Cardiovascular:      Rate and Rhythm: Normal rate and regular rhythm  Pulses: Normal pulses  Heart sounds: Normal heart sounds  No murmur heard  Pulmonary:      Effort: Pulmonary effort is normal  No respiratory distress  Breath sounds: Normal breath sounds  No wheezing  Abdominal:      General: Abdomen is flat  Bowel sounds are normal  There is no distension  Palpations: Abdomen is soft  Tenderness: There is no abdominal tenderness  Musculoskeletal:         General: Normal range of motion  Skin:     General: Skin is warm  Neurological:      Mental Status: She is alert  Mental status is at baseline     Psychiatric:         Mood and Affect: Mood normal

## 2022-06-24 NOTE — ASSESSMENT & PLAN NOTE
-symptoms of loose stools, constipation at times  -anxiety tends to worsen symptoms   -some bloating sensation/ gas sensation at times as well  -discussed with patient about potential etiologies for symptoms, including : IBS vs anxiety vs lactose intolerance vs gluten intolerance  -recommendations of: trial off lactose, off gluten foods (flour, breads, etc)  -patient agreeable for Gluten sensitivity testing  -can consider also potential medication of bentyl for gas relief as well

## 2022-06-24 NOTE — ASSESSMENT & PLAN NOTE
-much improved menses after starting OCP's medication  -no recent bleeding episodes reported  -on oral iron supplementation and vitamin C   -declines option for consider IV iron infusions  -recheck CBC to monitor Hgb levels  -ED precautions if sudden onset bleeding

## 2022-06-24 NOTE — ASSESSMENT & PLAN NOTE
-improvement in menses since starting OCP's  -tolerating well without complications  -improvement in cramps  -continue current treatment and follow up with PCP  -continue iron supplementation  -f/u CBC

## 2022-06-24 NOTE — ASSESSMENT & PLAN NOTE
-following with therapists  -current medication: klonopin 0 5 mg BID PRN  -working closely with therapists to potentially 'wean off' medication  -maintains busy lifestyle--> works 3 jobs and good distractors

## 2022-06-28 LAB
GLIADIN PEPTIDE+TTG IGA+IGG SER QL IA: POSITIVE
NOTE: NORMAL

## 2022-08-01 ENCOUNTER — OFFICE VISIT (OUTPATIENT)
Dept: FAMILY MEDICINE CLINIC | Facility: CLINIC | Age: 47
End: 2022-08-01

## 2022-08-01 VITALS
HEART RATE: 84 BPM | RESPIRATION RATE: 18 BRPM | DIASTOLIC BLOOD PRESSURE: 90 MMHG | HEIGHT: 61 IN | OXYGEN SATURATION: 99 % | TEMPERATURE: 98 F | BODY MASS INDEX: 28.98 KG/M2 | SYSTOLIC BLOOD PRESSURE: 128 MMHG | WEIGHT: 153.5 LBS

## 2022-08-01 DIAGNOSIS — D50.9 IRON DEFICIENCY ANEMIA, UNSPECIFIED IRON DEFICIENCY ANEMIA TYPE: Primary | ICD-10-CM

## 2022-08-01 DIAGNOSIS — N92.0 MENORRHAGIA WITH REGULAR CYCLE: ICD-10-CM

## 2022-08-01 DIAGNOSIS — Z12.11 ENCOUNTER FOR SCREENING COLONOSCOPY: ICD-10-CM

## 2022-08-01 DIAGNOSIS — R14.0 BLOATING: ICD-10-CM

## 2022-08-01 PROCEDURE — 99213 OFFICE O/P EST LOW 20 MIN: CPT | Performed by: FAMILY MEDICINE

## 2022-08-01 NOTE — PROGRESS NOTES
[facility]   [address], [address] [place] [address]  Phone#  [phone]  Fax#  [phone]    ASSESSMENT and PLAN  Iron deficiency anemia  · Likely in the setting of menorrhagia-which has improved   · Most recent Hgb 6/2022: 10 1 (up from 8 7)  · Iron Panel 4/2022: Low Ferritin, Iron, TIBC  · Continue Ferrous Sulfate 324 mg every other day  Hgb is stable can defer transfusions at this time  · F/u with PCP in 3 months and can repeat CBC at this time if warranted    Heavy menses  · Much improvement with OCP's, no current issues  · Continue OCP, Iron supplementation    Encounter for screening colonoscopy  · Scheduled GI appt 9/23/22-will schedule colonoscopy at that time     Bloating  · Positive gluten sensitivity test  · Feels bloating has improved   · Can adjust to gluten-free diet if warranted  If having no symptoms can resume original diet       Diagnoses and all orders for this visit:    Iron deficiency anemia, unspecified iron deficiency anemia type    Menorrhagia with regular cycle    Encounter for screening colonoscopy    Bloating        HISTORY OF PRESENT ILLNESS  Osmin Almanza is a very pleasant  42 y o  female with a significant PMHx of Anemia, Anxiety, PTSD, GERD who presents to the clinic for  management of their chronic medical conditions  Patient's medical conditions are stable unless noted otherwise above  Patient has not had any recent hospitalizations, or medical emergencies since last visit  Patient has no further complaints other than what is mentioned in the ROS  REVIEW OF SYSTEMS  Review of Systems   Constitutional: Negative for chills, fatigue and fever  HENT: Negative for sore throat  Respiratory: Negative for cough, chest tightness and shortness of breath  Cardiovascular: Negative for chest pain and leg swelling  Gastrointestinal: Negative for constipation, diarrhea, nausea and vomiting     Endocrine: Negative for polydipsia, polyphagia and polyuria  Genitourinary: Negative for dysuria  Musculoskeletal: Negative for arthralgias  Skin: Negative for rash  Neurological: Negative for dizziness, light-headedness and headaches  Psychiatric/Behavioral: Negative for agitation and behavioral problems  PAST MEDICAL HISTORY   Past Medical History:   Diagnosis Date    Anxiety      Past Surgical History:   Procedure Laterality Date     SECTION      DILATION AND CURETTAGE OF UTERUS       Social History     Socioeconomic History    Marital status: /Civil Union     Spouse name: Not on file    Number of children: Not on file    Years of education: Not on file    Highest education level: Not on file   Occupational History    Not on file   Tobacco Use    Smoking status: Never Smoker    Smokeless tobacco: Never Used   Vaping Use    Vaping Use: Never used   Substance and Sexual Activity    Alcohol use: Not Currently     Comment: occasional wine    Drug use: No    Sexual activity: Not on file   Other Topics Concern    Not on file   Social History Narrative    Not on file     Social Determinants of Health     Financial Resource Strain: Low Risk     Difficulty of Paying Living Expenses: Not hard at all   Food Insecurity: No Food Insecurity    Worried About [address] in the Last Year: Never true    [address] in the Last Year: Never true   Transportation Needs: No Transportation Needs    Lack of Transportation (Medical): No    Lack of Transportation (Non-Medical):  No   Physical Activity: Not on file   Stress: Not on file   Social Connections: Not on file   Intimate Partner Violence: Not on file   Housing Stability: Not on file     Family History   Problem Relation Age of Onset    Hypertension Mother     Hypertension Sister     Diabetes Maternal Grandmother     Colon cancer Maternal Grandfather 80    Diabetes Maternal Aunt     No Known Problems Father     No Known Problems Daughter     No Known Problems Paternal Grandmother     No Known Problems Paternal Grandfather     No Known Problems Sister     No Known Problems Son     No Known Problems Son     No Known Problems Son     No Known Problems Daughter     No Known Problems Daughter     Multiple myeloma Maternal Aunt     Cervical cancer Maternal Aunt        MEDICATIONS    Current Outpatient Medications:     ascorbic acid (VITAMIN C) 250 mg tablet, TAKE 1 TABLET BY MOUTH EVERY DAY, Disp: 30 tablet, Rfl: 1    clonazePAM (KlonoPIN) 0 5 mg tablet, Take 0 5 mg by mouth 2 (two) times a day as needed for seizures, Disp: , Rfl:     ferrous sulfate 324 (65 Fe) mg, TAKE 1 TABLET (324 MG TOTAL) BY MOUTH EVERY OTHER DAY, Disp: 15 tablet, Rfl: 11    menthol-cetylpyridinium (CEPACOL) 3 MG lozenge, Take 1 lozenge (3 mg total) by mouth as needed for sore throat (Patient not taking: Reported on 6/4/2021), Disp: 30 lozenge, Rfl: 1    norethindrone-ethinyl estradiol ([facility] FE 1/20) 1-20 MG-MCG per tablet, Take 1 tablet by mouth daily, Disp: 84 tablet, Rfl: 3    omeprazole (PriLOSEC) 40 MG capsule, Take 1 capsule (40 mg total) by mouth daily, Disp: , Rfl:     PHYSICAL EXAM  Vitals:    08/01/22 1106   BP: 128/90   BP Location: Left arm   Patient Position: Sitting   Cuff Size: Standard   Pulse: 84   Resp: 18   Temp: 98 °F (36 7 °C)   TempSrc: Temporal   SpO2: 99%   Weight: 69 6 kg (153 lb 8 oz)   Height: 5' 1" (1 549 m)     Wt Readings from Last 3 Encounters:   08/01/22 69 6 kg (153 lb 8 oz)   06/23/22 67 9 kg (149 lb 12 8 oz)   04/18/22 66 7 kg (147 lb)    , Body mass index is 29 kg/m²  Physical Exam  Constitutional:       Appearance: She is well-developed  HENT:      Head: Normocephalic and atraumatic  Eyes:      Pupils: Pupils are equal, round, and reactive to light  Cardiovascular:      Rate and Rhythm: Normal rate and regular rhythm  Heart sounds: Normal heart sounds     Pulmonary:      Effort: Pulmonary effort is normal       Breath sounds: Normal breath sounds  Abdominal:      General: Bowel sounds are normal       Palpations: Abdomen is soft  Musculoskeletal:         General: Normal range of motion  Cervical back: Normal range of motion and neck supple  Skin:     General: Skin is warm  Findings: No erythema or rash  Neurological:      Mental Status: She is alert and oriented to person, place, and time  Psychiatric:         Behavior: Behavior normal          LABS/IMAGING  No results found for: HGBA1C  Cholesterol   Date Value Ref Range Status   12/18/2015 123 mg/dL Final     Comment:     CHOLESTEROL:       Desirable        <200 mg/dl       Borderline High  200-239 mg/dl       High             >239 mg/dl  ____________________________________       HDL   Date Value Ref Range Status   12/18/2015 35 mg/dL Final     Comment:     HDL:       High       >59 mg/dl       Low        <41 mg/dl  ______________________________       HDL, Direct   Date Value Ref Range Status   04/18/2022 59 >=50 mg/dL Final     Comment:     Specimen collection should occur prior to Metamizole administration due to the potential for falsley depressed results  Triglycerides   Date Value Ref Range Status   04/18/2022 57 See Comment mg/dL Final     Comment:     Triglyceride:     0-9Y            <75mg/dL     10Y-17Y         <90 mg/dL       >=18Y     Normal          <150 mg/dL     Borderline High 150-199 mg/dL     High            200-499 mg/dL        Very High       >499 mg/dL    Specimen collection should occur prior to N-Acetylcysteine or Metamizole administration due to the potential for falsely depressed results     12/18/2015 49 mg/dL Final     Comment:     TRIGLYCERIDE:       Normal              <150 mg/dl       Borderline High    150-199 mg/dl       High               200-499 mg/dl       Very High          >499 mg/dl  _______________________________________        WBC   Date Value Ref Range Status   06/23/2022 4 81 4 31 - 10 16 Thousand/uL Final   04/18/2022 4 48 4 31 - 10 16 Thousand/uL Final   08/25/2021 4 60 4 50 - 11 00 Thousand/uL Final   12/18/2015 5 64 4 31 - 10 16 Thousand/uL Final   12/17/2015 7 24 4 31 - 10 16 Thousand/uL Final     Hemoglobin   Date Value Ref Range Status   06/23/2022 10 1 (L) 11 5 - 15 4 g/dL Final   04/18/2022 8 7 (L) 11 5 - 15 4 g/dL Final   08/25/2021 8 5 (L) 12 0 - 16 0 g/dL Final   12/18/2015 11 3 (L) 11 5 - 15 4 g/dL Final   12/17/2015 12 7 11 5 - 15 4 g/dL Final     Platelets   Date Value Ref Range Status   06/23/2022 266 149 - 390 Thousands/uL Final   04/18/2022 342 149 - 390 Thousands/uL Final   08/25/2021 246 150 - 450 Thousands/uL Final   12/18/2015 249 149 - 390 Thousand/uL Final   12/17/2015 271 149 - 390 Thousand/uL Final     Potassium   Date Value Ref Range Status   01/12/2021 3 8 3 5 - 5 3 mmol/L Final   12/18/2015 3 7 3 5 - 5 3 mmol/L Final   12/17/2015 3 5 3 5 - 5 3 mmol/L Final     Comment:     The above 1 analytes were performed by [facility]  [address] [place] [id number]     12/17/2015 Unable to analyze due to hemolysis 3 5 - 5 3 mmol/L Final     Chloride   Date Value Ref Range Status   01/12/2021 101 100 - 108 mmol/L Final   12/18/2015 110 (H) 100 - 108 mmol/L Final   12/17/2015 103 98 - 108 mmol/L Final     CO2   Date Value Ref Range Status   01/12/2021 26 21 - 32 mmol/L Final   12/18/2015 26 21 0 - 32 0 mmol/L Final   12/17/2015 23 6 21 0 - 32 0 mmol/L Final     Anion Gap   Date Value Ref Range Status   12/18/2015 9 4 - 13 mmol/L Final   12/17/2015 12 4 - 13 mmol/L Final     BUN   Date Value Ref Range Status   01/12/2021 8 5 - 25 mg/dL Final   12/18/2015 11 5 - 25 mg/dL Final   12/17/2015 11 5 - 25 mg/dL Final     Creatinine   Date Value Ref Range Status   01/12/2021 0 58 (L) 0 60 - 1 30 mg/dL Final     Comment:     Standardized to IDMS reference method   12/18/2015 0 61 0 60 - 1 30 mg/dL Final     Comment:     Standardized to IDMS reference method   12/17/2015 0 72 0 60 - 1 30 mg/dL Final     Comment: Standardized to IDMS reference method     eGFR   Date Value Ref Range Status   01/12/2021 113 ml/min/1 73sq m Final     Glucose   Date Value Ref Range Status   12/18/2015 88 65 - 140 mg/dL Final     Comment:     If patient is fasting, the ADA then defines impaired fasting glucose as  >100 mg/dl and diabetes as  >or equal to 126 mg/dl  12/17/2015 94 65 - 140 mg/dL Final     Comment:     If patient is fasting, the ADA then defines impaired fasting glucose as  >100 mg/dl and diabetes as  >or equal to 126 mg/dl  Calcium   Date Value Ref Range Status   01/12/2021 8 2 (L) 8 3 - 10 1 mg/dL Final   12/18/2015 8 4 8 3 - 10 1 mg/dL Final   12/17/2015 8 5 8 3 - 10 1 mg/dL Final     AST   Date Value Ref Range Status   01/12/2021 41 5 - 45 U/L Final     Comment:     Slightly Hemolyzed; Results May be Affected  Specimen collection should occur prior to Sulfasalazine administration due to the potential for falsely depressed results  12/18/2015 6 5 - 45 U/L Final   12/17/2015 8 5 - 45 U/L Final     Comment:     The above 1 analytes were performed by [facility] [facility] [place] [address]     12/17/2015 Unable to analyze due to hemolysis 5 - 45 U/L Final     ALT   Date Value Ref Range Status   01/12/2021 26 12 - 78 U/L Final     Comment:     Specimen collection should occur prior to Sulfasalazine administration due to the potential for falsely depressed results      12/18/2015 16 12 - 78 U/L Final   12/17/2015 17 12 - 78 U/L Final     Alkaline Phosphatase   Date Value Ref Range Status   01/12/2021 54 46 - 116 U/L Final   12/18/2015 58 46 - 116 U/L Final   12/17/2015 75 46 - 116 U/L Final     Total Protein   Date Value Ref Range Status   12/18/2015 7 0 6 4 - 8 2 g/dL Final   12/17/2015 8 3 (H) 6 4 - 8 2 g/dL Final     Total Bilirubin   Date Value Ref Range Status   12/18/2015 0 36 0 20 - 1 00 mg/dL Final   12/17/2015 0 41 0 20 - 1 00 mg/dL Final     Magnesium   Date Value Ref Range Status   12/17/2015 1 8 1 6 - 2 6 mg/dL Final     Comment:     The above 1 analytes were performed by [facility] [facility] ,[place] [address]       No results found for: TSH    This note has been dictated using [employer] software  It may contain errors, including improperly dictated words  Please contact physician directly for any questions       Stephen Garza MD   PGY-3

## 2022-08-01 NOTE — ASSESSMENT & PLAN NOTE
· Positive gluten sensitivity test  · Feels bloating has improved   · Can adjust to gluten-free diet if warranted   If having no symptoms can resume original diet

## 2022-08-01 NOTE — ASSESSMENT & PLAN NOTE
· Likely in the setting of menorrhagia-which has improved   · Most recent Hgb 6/2022: 10 1 (up from 8 7)  · Iron Panel 4/2022: Low Ferritin, Iron, TIBC  · Continue Ferrous Sulfate 324 mg every other day   Hgb is stable can defer transfusions at this time  · F/u with PCP in 3 months and can repeat CBC at this time if warranted

## 2022-11-17 ENCOUNTER — OFFICE VISIT (OUTPATIENT)
Dept: FAMILY MEDICINE CLINIC | Facility: CLINIC | Age: 47
End: 2022-11-17

## 2022-11-17 VITALS
DIASTOLIC BLOOD PRESSURE: 80 MMHG | RESPIRATION RATE: 18 BRPM | TEMPERATURE: 97.8 F | HEART RATE: 80 BPM | BODY MASS INDEX: 30.78 KG/M2 | WEIGHT: 163 LBS | HEIGHT: 61 IN | SYSTOLIC BLOOD PRESSURE: 128 MMHG | OXYGEN SATURATION: 99 %

## 2022-11-17 DIAGNOSIS — Z00.00 HEALTHCARE MAINTENANCE: ICD-10-CM

## 2022-11-17 DIAGNOSIS — Z12.11 ENCOUNTER FOR SCREENING COLONOSCOPY: ICD-10-CM

## 2022-11-17 DIAGNOSIS — D50.9 IRON DEFICIENCY ANEMIA, UNSPECIFIED IRON DEFICIENCY ANEMIA TYPE: ICD-10-CM

## 2022-11-17 DIAGNOSIS — Z02.1 PHYSICAL EXAM, PRE-EMPLOYMENT: Primary | ICD-10-CM

## 2022-11-17 PROBLEM — R13.10 DIFFICULTY SWALLOWING: Status: RESOLVED | Noted: 2020-12-04 | Resolved: 2022-11-17

## 2022-11-17 PROBLEM — B34.9 VIRAL INFECTION, UNSPECIFIED: Status: RESOLVED | Noted: 2021-01-05 | Resolved: 2022-11-17

## 2022-11-17 PROBLEM — R23.3 EASY BRUISING: Status: RESOLVED | Noted: 2022-04-18 | Resolved: 2022-11-17

## 2022-11-17 NOTE — ASSESSMENT & PLAN NOTE
· Will be working at [address] [place] [address] in [facility] [employer] [facility] as a   · Vaccinations UTD  · Physical Exam without any abnormalities  · TB testing UTD per patient; if any difficulties will order Quantiferon Gold  · Forms filled out and handed back to patient

## 2022-11-17 NOTE — ASSESSMENT & PLAN NOTE
· Much improved  · Likely in the setting of menorrhagia-which has improved   · Most recent Hgb 6/2022: 10 1 (up from 8 7)  · Iron Panel 4/2022: Low Ferritin, Iron, TIBC  · Continue Ferrous Sulfate 324 mg every other day   Hgb is stable can defer transfusions at this time  · Will repeat CBC to monitor Hemoglobin

## 2022-11-17 NOTE — PROGRESS NOTES
[facility]   [address] [facility], [address]  Phone#  [phone]  Fax#  [phone]    ASSESSMENT and PLAN  Physical exam, pre-employment  · Will be working at [address] [place] [address] in [facility] as a   · Vaccinations UTD  · Physical Exam without any abnormalities  · TB testing UTD per patient; if any difficulties will order Quantiferon Gold  · Forms filled out and handed back to patient  Encounter for screening colonoscopy  · Scheduled GI appt 11/30/22-will schedule colonoscopy at that time     Iron deficiency anemia  · Much improved  · Likely in the setting of menorrhagia-which has improved   · Most recent Hgb 6/2022: 10 1 (up from 8 7)  · Iron Panel 4/2022: Low Ferritin, Iron, TIBC  · Continue Ferrous Sulfate 324 mg every other day  Hgb is stable can defer transfusions at this time  · Will repeat CBC to monitor Hemoglobin    Healthcare maintenance  · Flu vaccine denied today      Diagnoses and all orders for this visit:    Physical exam, pre-employment    Iron deficiency anemia, unspecified iron deficiency anemia type  -     CBC and differential; Future    Encounter for screening colonoscopy      HISTORY OF PRESENT ILLNESS  Miguel Rudolph is a 41 y o  female with a significant PMHx of  who presents to the clinic for FAUSTINO presents to the clinic for management of their chronic medical conditions and a pre-employment physical  Patient's medical conditions are stable unless noted otherwise above  Patient has not had any recent hospitalizations, or medical emergencies since last visit  Patient has no further complaints other than what is mentioned in the ROS  Smoking/Alcohol/Illicit Drug Use: denies  REVIEW OF SYSTEMS  Review of Systems   Constitutional: Negative for chills, fatigue and fever  HENT: Negative for sore throat  Respiratory: Negative for cough, chest tightness and shortness of breath      Cardiovascular: Negative for chest pain and leg swelling  Gastrointestinal: Negative for constipation, diarrhea, nausea and vomiting  Endocrine: Negative for polydipsia, polyphagia and polyuria  Genitourinary: Negative for dysuria  Musculoskeletal: Negative for arthralgias  Skin: Negative for rash  Neurological: Negative for dizziness, light-headedness and headaches  Psychiatric/Behavioral: Negative for agitation and behavioral problems  PAST MEDICAL HISTORY   Past Medical History:   Diagnosis Date   • Annual physical exam 2020   • Anxiety    • Difficulty swallowing 2020   • Easy bruising 2022   • Viral infection, unspecified 2021     Past Surgical History:   Procedure Laterality Date   •  SECTION     • DILATION AND CURETTAGE OF UTERUS       Social History     Socioeconomic History   • Marital status: /Civil Union     Spouse name: Not on file   • Number of children: Not on file   • Years of education: Not on file   • Highest education level: Not on file   Occupational History   • Not on file   Tobacco Use   • Smoking status: Never   • Smokeless tobacco: Never   Vaping Use   • Vaping Use: Never used   Substance and Sexual Activity   • Alcohol use: Not Currently     Comment: occasional wine   • Drug use: No   • Sexual activity: Not on file   Other Topics Concern   • Not on file   Social History Narrative   • Not on file     Social Determinants of Health     Financial Resource Strain: Low Risk    • Difficulty of Paying Living Expenses: Not hard at all   Food Insecurity: No Food Insecurity   • Worried About Running Out of Food in the Last Year: Never true   • Ran Out of Food in the Last Year: Never true   Transportation Needs: No Transportation Needs   • Lack of Transportation (Medical): No   • Lack of Transportation (Non-Medical):  No   Physical Activity: Not on file   Stress: Not on file   Social Connections: Not on file   Intimate Partner Violence: Not on file   Housing Stability: Not on file     Family History   Problem Relation Age of Onset   • Hypertension Mother    • Hypertension Sister    • Diabetes Maternal Grandmother    • Colon cancer Maternal Grandfather 80   • Diabetes Maternal Aunt    • No Known Problems Father    • No Known Problems Daughter    • No Known Problems Paternal Grandmother    • No Known Problems Paternal Grandfather    • No Known Problems Sister    • No Known Problems Son    • No Known Problems Son    • No Known Problems Son    • No Known Problems Daughter    • No Known Problems Daughter    • Multiple myeloma Maternal Aunt    • Cervical cancer Maternal Aunt        MEDICATIONS    Current Outpatient Medications:   •  ascorbic acid (VITAMIN C) 250 mg tablet, TAKE 1 TABLET BY MOUTH EVERY DAY, Disp: 30 tablet, Rfl: 1  •  clonazePAM (KlonoPIN) 0 5 mg tablet, Take 0 5 mg by mouth 2 (two) times a day as needed for seizures, Disp: , Rfl:   •  ferrous sulfate 324 (65 Fe) mg, TAKE 1 TABLET (324 MG TOTAL) BY MOUTH EVERY OTHER DAY, Disp: 15 tablet, Rfl: 11  •  norethindrone-ethinyl estradiol (Junel FE 1/20) 1-20 MG-MCG per tablet, Take 1 tablet by mouth daily, Disp: 84 tablet, Rfl: 3  •  omeprazole (PriLOSEC) 40 MG capsule, Take 1 capsule (40 mg total) by mouth daily, Disp: , Rfl:     PHYSICAL EXAM  Vitals:    11/17/22 1356   BP: 128/80   BP Location: Left arm   Patient Position: Sitting   Cuff Size: Standard   Pulse: 80   Resp: 18   Temp: 97 8 °F (36 6 °C)   TempSrc: Temporal   SpO2: 99%   Weight: 73 9 kg (163 lb)   Height: 5' 1" (1 549 m)     Wt Readings from Last 3 Encounters:   11/17/22 73 9 kg (163 lb)   08/01/22 69 6 kg (153 lb 8 oz)   06/23/22 67 9 kg (149 lb 12 8 oz)    , Body mass index is 30 8 kg/m²  Physical Exam  Constitutional:       Appearance: She is well-developed and well-nourished  HENT:      Head: Normocephalic and atraumatic  Eyes:      Extraocular Movements: EOM normal       Pupils: Pupils are equal, round, and reactive to light     Cardiovascular:      Rate and Rhythm: Normal rate and regular rhythm  Heart sounds: Normal heart sounds  Pulmonary:      Effort: Pulmonary effort is normal       Breath sounds: Normal breath sounds  Abdominal:      General: Bowel sounds are normal       Palpations: Abdomen is soft  Musculoskeletal:         General: Normal range of motion  Cervical back: Normal range of motion and neck supple  Skin:     General: Skin is warm  Findings: No erythema or rash  Neurological:      Mental Status: She is alert and oriented to person, place, and time  Psychiatric:         Mood and Affect: Mood and affect normal          Behavior: Behavior normal          LABS/IMAGING  No results found for: HGBA1C  Cholesterol   Date Value Ref Range Status   12/18/2015 123 mg/dL Final     Comment:     CHOLESTEROL:       Desirable        <200 mg/dl       Borderline High  200-239 mg/dl       High             >239 mg/dl  ____________________________________       HDL   Date Value Ref Range Status   12/18/2015 35 mg/dL Final     Comment:     HDL:       High       >59 mg/dl       Low        <41 mg/dl  ______________________________       HDL, Direct   Date Value Ref Range Status   04/18/2022 59 >=50 mg/dL Final     Comment:     Specimen collection should occur prior to Metamizole administration due to the potential for falsley depressed results  Triglycerides   Date Value Ref Range Status   04/18/2022 57 See Comment mg/dL Final     Comment:     Triglyceride:     0-9Y            <75mg/dL     10Y-17Y         <90 mg/dL       >=18Y     Normal          <150 mg/dL     Borderline High 150-199 mg/dL     High            200-499 mg/dL        Very High       >499 mg/dL    Specimen collection should occur prior to N-Acetylcysteine or Metamizole administration due to the potential for falsely depressed results     12/18/2015 49 mg/dL Final     Comment:     TRIGLYCERIDE:       Normal              <150 mg/dl       Borderline High    150-199 mg/dl       High 200-499 mg/dl       Very High          >499 mg/dl  _______________________________________        WBC   Date Value Ref Range Status   06/23/2022 4 81 4 31 - 10 16 Thousand/uL Final   04/18/2022 4 48 4 31 - 10 16 Thousand/uL Final   08/25/2021 4 60 4 50 - 11 00 Thousand/uL Final   12/18/2015 5 64 4 31 - 10 16 Thousand/uL Final   12/17/2015 7 24 4 31 - 10 16 Thousand/uL Final     Hemoglobin   Date Value Ref Range Status   06/23/2022 10 1 (L) 11 5 - 15 4 g/dL Final   04/18/2022 8 7 (L) 11 5 - 15 4 g/dL Final   08/25/2021 8 5 (L) 12 0 - 16 0 g/dL Final   12/18/2015 11 3 (L) 11 5 - 15 4 g/dL Final   12/17/2015 12 7 11 5 - 15 4 g/dL Final     Platelets   Date Value Ref Range Status   06/23/2022 266 149 - 390 Thousands/uL Final   04/18/2022 342 149 - 390 Thousands/uL Final   08/25/2021 246 150 - 450 Thousands/uL Final   12/18/2015 249 149 - 390 Thousand/uL Final   12/17/2015 271 149 - 390 Thousand/uL Final     Potassium   Date Value Ref Range Status   01/12/2021 3 8 3 5 - 5 3 mmol/L Final   12/18/2015 3 7 3 5 - 5 3 mmol/L Final   12/17/2015 3 5 3 5 - 5 3 mmol/L Final     Comment:     The above 1 analytes were performed by Andrea  [address] [place] [id number]     12/17/2015 Unable to analyze due to hemolysis 3 5 - 5 3 mmol/L Final     Chloride   Date Value Ref Range Status   01/12/2021 101 100 - 108 mmol/L Final   12/18/2015 110 (H) 100 - 108 mmol/L Final   12/17/2015 103 98 - 108 mmol/L Final     CO2   Date Value Ref Range Status   01/12/2021 26 21 - 32 mmol/L Final   12/18/2015 26 21 0 - 32 0 mmol/L Final   12/17/2015 23 6 21 0 - 32 0 mmol/L Final     Anion Gap   Date Value Ref Range Status   12/18/2015 9 4 - 13 mmol/L Final   12/17/2015 12 4 - 13 mmol/L Final     BUN   Date Value Ref Range Status   01/12/2021 8 5 - 25 mg/dL Final   12/18/2015 11 5 - 25 mg/dL Final   12/17/2015 11 5 - 25 mg/dL Final     Creatinine   Date Value Ref Range Status   01/12/2021 0 58 (L) 0 60 - 1 30 mg/dL Final     Comment: Standardized to IDMS reference method   12/18/2015 0 61 0 60 - 1 30 mg/dL Final     Comment:     Standardized to IDMS reference method   12/17/2015 0 72 0 60 - 1 30 mg/dL Final     Comment:     Standardized to IDMS reference method     eGFR   Date Value Ref Range Status   01/12/2021 113 ml/min/1 73sq m Final     Glucose   Date Value Ref Range Status   12/18/2015 88 65 - 140 mg/dL Final     Comment:     If patient is fasting, the ADA then defines impaired fasting glucose as  >100 mg/dl and diabetes as  >or equal to 126 mg/dl  12/17/2015 94 65 - 140 mg/dL Final     Comment:     If patient is fasting, the ADA then defines impaired fasting glucose as  >100 mg/dl and diabetes as  >or equal to 126 mg/dl  Calcium   Date Value Ref Range Status   01/12/2021 8 2 (L) 8 3 - 10 1 mg/dL Final   12/18/2015 8 4 8 3 - 10 1 mg/dL Final   12/17/2015 8 5 8 3 - 10 1 mg/dL Final     AST   Date Value Ref Range Status   01/12/2021 41 5 - 45 U/L Final     Comment:     Slightly Hemolyzed; Results May be Affected  Specimen collection should occur prior to Sulfasalazine administration due to the potential for falsely depressed results  12/18/2015 6 5 - 45 U/L Final   12/17/2015 8 5 - 45 U/L Final     Comment:     The above 1 analytes were performed by [facility] [place] [address]     12/17/2015 Unable to analyze due to hemolysis 5 - 45 U/L Final     ALT   Date Value Ref Range Status   01/12/2021 26 12 - 78 U/L Final     Comment:     Specimen collection should occur prior to Sulfasalazine administration due to the potential for falsely depressed results      12/18/2015 16 12 - 78 U/L Final   12/17/2015 17 12 - 78 U/L Final     Alkaline Phosphatase   Date Value Ref Range Status   01/12/2021 54 46 - 116 U/L Final   12/18/2015 58 46 - 116 U/L Final   12/17/2015 75 46 - 116 U/L Final     Total Protein   Date Value Ref Range Status   12/18/2015 7 0 6 4 - 8 2 g/dL Final   12/17/2015 8 3 (H) 6 4 - 8 2 g/dL Final Total Bilirubin   Date Value Ref Range Status   12/18/2015 0 36 0 20 - 1 00 mg/dL Final   12/17/2015 0 41 0 20 - 1 00 mg/dL Final     Magnesium   Date Value Ref Range Status   12/17/2015 1 8 1 6 - 2 6 mg/dL Final     Comment:     The above 1 analytes were performed by [facility] [facility] [address]       No results found for: TSH    This note has been dictated using [employer] software  It may contain errors, including improperly dictated words  Please contact physician directly for any questions       Karen Paez MD   PGY-3

## 2022-11-23 ENCOUNTER — OCCMED (OUTPATIENT)
Dept: URGENT CARE | Facility: CLINIC | Age: 47
End: 2022-11-23

## 2022-11-23 ENCOUNTER — APPOINTMENT (OUTPATIENT)
Dept: LAB | Facility: CLINIC | Age: 47
End: 2022-11-23

## 2022-11-23 DIAGNOSIS — Z02.1 PRE-EMPLOYMENT HEALTH SCREENING EXAMINATION: ICD-10-CM

## 2022-11-23 DIAGNOSIS — Z02.1 PRE-EMPLOYMENT HEALTH SCREENING EXAMINATION: Primary | ICD-10-CM

## 2022-11-23 LAB — RUBV IGG SERPL IA-ACNC: 43.8 IU/ML

## 2022-11-26 LAB
MEV IGG SER QL IA: NORMAL
MUV IGG SER QL IA: NORMAL
VZV IGG SER QL IA: NORMAL

## 2022-11-28 DIAGNOSIS — N92.0 MENORRHAGIA WITH REGULAR CYCLE: ICD-10-CM

## 2022-11-28 DIAGNOSIS — Z30.09 BIRTH CONTROL COUNSELING: ICD-10-CM

## 2022-11-28 LAB
GAMMA INTERFERON BACKGROUND BLD IA-ACNC: 0.03 IU/ML
M TB IFN-G BLD-IMP: NEGATIVE
M TB IFN-G CD4+ BCKGRND COR BLD-ACNC: 0.01 IU/ML
M TB IFN-G CD4+ BCKGRND COR BLD-ACNC: 0.01 IU/ML
MITOGEN IGNF BCKGRD COR BLD-ACNC: >10 IU/ML

## 2022-11-28 RX ORDER — NORETHINDRONE ACETATE AND ETHINYL ESTRADIOL AND FERROUS FUMARATE 1MG-20(21)
KIT ORAL
Qty: 84 TABLET | Refills: 3 | Status: SHIPPED | OUTPATIENT
Start: 2022-11-28

## 2023-01-16 PROBLEM — Z00.00 HEALTHCARE MAINTENANCE: Status: RESOLVED | Noted: 2022-11-17 | Resolved: 2023-01-16

## 2023-05-12 ENCOUNTER — OFFICE VISIT (OUTPATIENT)
Dept: FAMILY MEDICINE CLINIC | Facility: CLINIC | Age: 48
End: 2023-05-12

## 2023-05-12 VITALS
RESPIRATION RATE: 18 BRPM | HEIGHT: 61 IN | BODY MASS INDEX: 31.57 KG/M2 | TEMPERATURE: 97.3 F | OXYGEN SATURATION: 98 % | SYSTOLIC BLOOD PRESSURE: 128 MMHG | DIASTOLIC BLOOD PRESSURE: 92 MMHG | WEIGHT: 167.2 LBS | HEART RATE: 88 BPM

## 2023-05-12 DIAGNOSIS — Z02.1 PHYSICAL EXAM, PRE-EMPLOYMENT: Primary | ICD-10-CM

## 2023-05-12 NOTE — PROGRESS NOTES
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[place] [address] Sayda is a 41 y o  female who presented to the office today for a preemployment physical and a resource parent medical report, as she is in the process of adopting her grandson  Patient reports she is feeling well today and has no complaints  The following portions of the patient's history were reviewed and updated as appropriate: allergies, current medications, past family history, past medical history, past social history, past surgical history and problem list       Review of Systems   Constitutional: Negative for chills and fever  HENT: Negative for congestion, rhinorrhea and sore throat  Respiratory: Negative for cough and shortness of breath  Cardiovascular: Negative for chest pain  Gastrointestinal: Negative for diarrhea, nausea and vomiting  Skin: Negative for rash  Neurological: Negative for dizziness and headaches         Current Outpatient Medications on File Prior to Visit   Medication Sig   • albuterol (ProAir HFA) 90 mcg/act inhaler Inhale 2 puffs every 6 (six) hours as needed for wheezing   • ascorbic acid (VITAMIN C) 250 mg tablet TAKE 1 TABLET BY MOUTH EVERY DAY   • benzonatate (TESSALON PERLES) 100 mg capsule Take 1 capsule (100 mg total) by mouth 3 (three) times a day as needed for cough   • clonazePAM (KlonoPIN) 0 5 mg tablet Take 0 5 mg by mouth 2 (two) times a day as needed for seizures   • ferrous sulfate 324 (65 Fe) mg TAKE 1 TABLET (324 MG TOTAL) BY MOUTH EVERY OTHER DAY   • Junel FE  1-20 MG-MCG per tablet TAKE 1 TABLET BY MOUTH EVERY DAY   • omeprazole (PriLOSEC) 40 MG capsule Take "1 capsule (40 mg total) by mouth daily       Objective     /92 (BP Location: Right arm, Patient Position: Sitting, Cuff Size: Standard)   Pulse 88   Temp (!) 97 3 °F (36 3 °C) (Temporal)   Resp 18   Ht 5' 1\" (1 549 m)   Wt 75 8 kg (167 lb 3 2 oz)   SpO2 98%   BMI 31 59 kg/m²     Physical Exam  Vitals and nursing note reviewed  Constitutional:       Appearance: She is well-developed  HENT:      Head: Normocephalic and atraumatic  Nose: Nose normal       Mouth/Throat:      Mouth: Mucous membranes are moist    Eyes:      Extraocular Movements: Extraocular movements intact  Conjunctiva/sclera: Conjunctivae normal    Cardiovascular:      Rate and Rhythm: Normal rate and regular rhythm  Heart sounds: Normal heart sounds  No murmur heard  Pulmonary:      Effort: Pulmonary effort is normal  No respiratory distress  Breath sounds: Normal breath sounds  Abdominal:      General: There is no distension  Palpations: Abdomen is soft  Musculoskeletal:      Right lower leg: No edema  Left lower leg: No edema  Skin:     Capillary Refill: Capillary refill takes less than 2 seconds  Neurological:      General: No focal deficit present  Mental Status: She is alert and oriented to person, place, and time     Psychiatric:         Mood and Affect: Mood normal          Behavior: Behavior normal        King Fallon MD  "

## 2023-05-31 DIAGNOSIS — D50.8 OTHER IRON DEFICIENCY ANEMIA: ICD-10-CM

## 2023-05-31 DIAGNOSIS — N92.0 MENORRHAGIA WITH REGULAR CYCLE: ICD-10-CM

## 2023-05-31 RX ORDER — FERROUS SULFATE TAB EC 324 MG (65 MG FE EQUIVALENT) 324 (65 FE) MG
324 TABLET DELAYED RESPONSE ORAL EVERY OTHER DAY
Qty: 15 TABLET | Refills: 11 | Status: SHIPPED | OUTPATIENT
Start: 2023-05-31

## 2023-07-06 ENCOUNTER — HOSPITAL ENCOUNTER (OUTPATIENT)
Dept: MAMMOGRAPHY | Facility: MEDICAL CENTER | Age: 48
Discharge: HOME | End: 2023-07-06
Payer: COMMERCIAL

## 2023-07-06 VITALS — WEIGHT: 167 LBS | HEIGHT: 61 IN | BODY MASS INDEX: 31.53 KG/M2

## 2023-07-06 DIAGNOSIS — Z12.31 ENCOUNTER FOR SCREENING MAMMOGRAM FOR MALIGNANT NEOPLASM OF BREAST: ICD-10-CM

## 2023-07-06 PROCEDURE — 77067 SCR MAMMO BI INCL CAD: CPT

## 2023-07-06 PROCEDURE — 77063 BREAST TOMOSYNTHESIS BI: CPT

## 2023-08-15 ENCOUNTER — OFFICE VISIT (OUTPATIENT)
Dept: URGENT CARE | Age: 48
End: 2023-08-15
Payer: COMMERCIAL

## 2023-08-15 VITALS
OXYGEN SATURATION: 100 % | BODY MASS INDEX: 31.53 KG/M2 | RESPIRATION RATE: 18 BRPM | WEIGHT: 167 LBS | HEIGHT: 61 IN | HEART RATE: 66 BPM | TEMPERATURE: 98.6 F

## 2023-08-15 DIAGNOSIS — S76.112A STRAIN OF LEFT QUADRICEPS, INITIAL ENCOUNTER: Primary | ICD-10-CM

## 2023-08-15 PROCEDURE — 99213 OFFICE O/P EST LOW 20 MIN: CPT | Performed by: EMERGENCY MEDICINE

## 2023-08-15 RX ORDER — LIDOCAINE 50 MG/G
1 PATCH TOPICAL DAILY
Qty: 30 PATCH | Refills: 1 | Status: SHIPPED | OUTPATIENT
Start: 2023-08-15 | End: 2023-09-14

## 2023-08-15 NOTE — PATIENT INSTRUCTIONS
You may take Tylenol 1000 mg every 6 hours as needed and ibuprofen 400 mg every 8 hours as needed for pain  Do not exceed 4000 mg of Tylenol daily

## 2023-08-16 NOTE — PROGRESS NOTES
[facility]        NAME: iJmmie Holm is a 41 y.o. female  : 1975    MRN: [id number]  DATE: August 15, 2023  TIME: 8:45 PM    Assessment and Plan   Strain of left quadriceps, initial encounter [S76.112A]  1. Strain of left quadriceps, initial encounter  Ambulatory Referral to Sports Medicine    lidocaine (Lidoderm) 5 %        -Presentation likely secondary to left quadriceps muscle belly strain  -No additional trauma to the area fall to the ground, will defer imaging at this time pending trial of conservative management  -Anticipated guidance given regarding conservative management at home to include application of ice, NSAIDs and Tylenol can also give prescription for Lidoderm patch  -Advised patient to follow-up with sports medicine if she is not feeling better within the next week or so and to follow-up with her PCP  -Advised patient if she develops acutely worsening pain or inability to ambulate to seek care in the ED  -All questions answered at bedside, patient is amenable to plan and voiced understanding    Patient Instructions       Follow up with PCP in 3-5 days. Proceed to  ER if symptoms worsen. Chief Complaint     Chief Complaint   Patient presents with   • Leg Pain     Left upper leg pain after playing kickball at work yesterday. Patient unable to bear full weight. History of Present Illness       79-year-old female, previously healthy presents with chief complaint of left leg pain after straining it while playing kickball at her  where she works yesterday. Patient states that she went to "kick the ball really hard" and states that after kicking the ball she felt a pull in the anterior aspect of her left thigh. She denies subsequent fall to the ground or head strike or additional injury to the area. She states that she noticed pain and some difficulty ambulating on the affected extremity after the event.   Has not taken any medication or applied ice to the affected area. She denies fever, rigors, affected numbness tingling motor weakness or sensory deficits in the affected area. Denies additional injury. Leg Pain   The incident occurred 12 to 24 hours ago. The incident occurred at work. The pain is present in the left leg. The quality of the pain is described as aching and cramping. The pain is at a severity of 5/10. The pain is moderate. The pain has been fluctuating since onset. Associated symptoms include muscle weakness. Pertinent negatives include no inability to bear weight, loss of motion, loss of sensation, numbness or tingling. Review of Systems   Review of Systems   Constitutional: Negative for activity change, appetite change, chills, diaphoresis, fatigue and fever. HENT: Negative for congestion, ear pain and sore throat. Eyes: Negative for pain and visual disturbance. Respiratory: Negative for cough and shortness of breath. Cardiovascular: Negative for chest pain and palpitations. Gastrointestinal: Negative for abdominal pain, nausea and vomiting. Genitourinary: Negative for dysuria and hematuria. Musculoskeletal: Positive for myalgias. Negative for arthralgias, back pain, gait problem, joint swelling, neck pain and neck stiffness. Skin: Negative for color change and rash. Neurological: Negative for dizziness, tingling, tremors, seizures, syncope, facial asymmetry, speech difficulty, weakness, light-headedness, numbness and headaches. All other systems reviewed and are negative.         Current Medications       Current Outpatient Medications:   •  ascorbic acid (VITAMIN C) 250 mg tablet, TAKE 1 TABLET BY MOUTH EVERY DAY, Disp: 30 tablet, Rfl: 1  •  clonazePAM (KlonoPIN) 0.5 mg tablet, Take 0.5 mg by mouth 2 (two) times a day as needed for seizures, Disp: , Rfl:   •  ferrous sulfate 324 (65 Fe) mg, TAKE 1 TABLET (324 MG TOTAL) BY MOUTH EVERY OTHER DAY, Disp: 15 tablet, Rfl: 11  •  Junel FE 1/20 1-20 MG-MCG per tablet, TAKE 1 TABLET BY MOUTH EVERY DAY, Disp: 84 tablet, Rfl: 3  •  lidocaine (Lidoderm) 5 %, Apply 1 patch topically over 12 hours daily Remove & Discard patch within 12 hours or as directed by MD, Disp: 30 patch, Rfl: 1  •  omeprazole (PriLOSEC) 40 MG capsule, Take 1 capsule (40 mg total) by mouth daily, Disp: , Rfl:   •  albuterol (ProAir HFA) 90 mcg/act inhaler, Inhale 2 puffs every 6 (six) hours as needed for wheezing, Disp: 8.5 g, Rfl: 0  •  benzonatate (TESSALON PERLES) 100 mg capsule, Take 1 capsule (100 mg total) by mouth 3 (three) times a day as needed for cough, Disp: 20 capsule, Rfl: 0    Current Allergies     Allergies as of 08/15/2023 - Reviewed 08/15/2023   Allergen Reaction Noted   • Amoxicillin Rash 2021   • Penicillins Rash 2021            The following portions of the patient's history were reviewed and updated as appropriate: allergies, current medications, past family history, past medical history, past social history, past surgical history and problem list.     Past Medical History:   Diagnosis Date   • Annual physical exam 2020   • Anxiety    • Difficulty swallowing 2020   • Easy bruising 2022   • Viral infection, unspecified 2021       Past Surgical History:   Procedure Laterality Date   •  SECTION     • DILATION AND CURETTAGE OF UTERUS         Family History   Problem Relation Age of Onset   • Hypertension Mother    • No Known Problems Father    • Hypertension Sister    • No Known Problems Sister    • No Known Problems Daughter    • No Known Problems Daughter    • No Known Problems Daughter    • Diabetes Maternal Grandmother    • Colon cancer Maternal Grandfather 80   • No Known Problems Paternal Grandmother    • No Known Problems Paternal Grandfather    • No Known Problems Son    • No Known Problems Son    • No Known Problems Son    • Diabetes Maternal Aunt    • Multiple myeloma Maternal Aunt    • Cervical cancer Maternal Aunt         age dx unknown Medications have been verified. Objective   Pulse 66   Temp 98.6 °F (37 °C) (Tympanic)   Resp 18   Ht 5' 1" (1.549 m)   Wt 75.8 kg (167 lb)   SpO2 100%   BMI 31.55 kg/m²   No LMP recorded. Physical Exam     Physical Exam  Constitutional:       Appearance: Normal appearance. She is obese. HENT:      Head: Atraumatic. Right Ear: External ear normal.      Left Ear: External ear normal.      Nose: Nose normal.      Mouth/Throat:      Mouth: Mucous membranes are moist.   Eyes:      Extraocular Movements: Extraocular movements intact. Pupils: Pupils are equal, round, and reactive to light. Cardiovascular:      Rate and Rhythm: Normal rate and regular rhythm. Pulses: Normal pulses. Musculoskeletal:         General: Tenderness present. Cervical back: Normal range of motion and neck supple. No rigidity or tenderness. Legs:    Neurological:      Mental Status: She is alert.

## 2023-08-21 ENCOUNTER — OFFICE VISIT (OUTPATIENT)
Dept: FAMILY MEDICINE CLINIC | Facility: CLINIC | Age: 48
End: 2023-08-21

## 2023-08-21 VITALS
BODY MASS INDEX: 31.53 KG/M2 | HEART RATE: 97 BPM | RESPIRATION RATE: 16 BRPM | WEIGHT: 167 LBS | OXYGEN SATURATION: 99 % | DIASTOLIC BLOOD PRESSURE: 80 MMHG | TEMPERATURE: 97.5 F | HEIGHT: 61 IN | SYSTOLIC BLOOD PRESSURE: 130 MMHG

## 2023-08-21 DIAGNOSIS — N92.0 MENORRHAGIA WITH REGULAR CYCLE: Primary | ICD-10-CM

## 2023-08-21 DIAGNOSIS — D50.9 IRON DEFICIENCY ANEMIA, UNSPECIFIED IRON DEFICIENCY ANEMIA TYPE: ICD-10-CM

## 2023-08-21 PROBLEM — Z02.1 PHYSICAL EXAM, PRE-EMPLOYMENT: Status: RESOLVED | Noted: 2022-11-17 | Resolved: 2023-08-21

## 2023-08-21 PROCEDURE — 99213 OFFICE O/P EST LOW 20 MIN: CPT | Performed by: FAMILY MEDICINE

## 2023-08-21 NOTE — PROGRESS NOTES
Name: aMo Moreno      : 1975      MRN: [id number]  Encounter Provider: Costa Stokes MD  Encounter Date: 2023   Encounter department: [address] [facility],[address]     1. Menorrhagia with regular cycle  Assessment & Plan:  Discuss different options for Contraception  Pt declines any interventionalother methods at this time  Pt agreeable to possible OCP holiday for 3 months  Will monitor her menstrual bleeding, if excessive will start OCP again  Possible referral to Gyn as well in future    Orders:  -     CBC and differential; Future  -     Iron Panel (Includes Ferritin, Iron Sat%, Iron, and TIBC); Future    2. Iron deficiency anemia, unspecified iron deficiency anemia type  -     CBC and differential; Future  -     Iron Panel (Includes Ferritin, Iron Sat%, Iron, and TIBC); Future       Subjective      HPI   Mao Moreno is a 41 y.o. female  who presented to the office today today to discuss contraceptive options. Patient was started about 1 year ago on oral contraceptive pills due to heavy menstruation and anemia. She states that she has been taking the [facility]  daily and it has helped with the heavy bleeding and anemia but she dislikes the side effects of weight gain, bloating and mood changes. She she mentions of late, she has had difficulty concentrating and some memory issues. She is inquiring about other options, but dislikes anything interventional.    The following portions of the patient's history were reviewed and updated as appropriate: allergies, current medications, past family history, past medical history, past social history, past surgical history and problem list.    Review of Systems   Constitutional: Negative for chills and fever. HENT: Negative for congestion, rhinorrhea and sore throat. Respiratory: Negative for cough and shortness of breath. Cardiovascular: Negative for chest pain.    Gastrointestinal: Negative for diarrhea, nausea and vomiting. Skin: Negative for rash. Neurological: Negative for dizziness and headaches. Current Outpatient Medications on File Prior to Visit   Medication Sig   • albuterol (ProAir HFA) 90 mcg/act inhaler Inhale 2 puffs every 6 (six) hours as needed for wheezing   • ascorbic acid (VITAMIN C) 250 mg tablet TAKE 1 TABLET BY MOUTH EVERY DAY   • benzonatate (TESSALON PERLES) 100 mg capsule Take 1 capsule (100 mg total) by mouth 3 (three) times a day as needed for cough   • clonazePAM (KlonoPIN) 0.5 mg tablet Take 0.5 mg by mouth 2 (two) times a day as needed for seizures   • ferrous sulfate 324 (65 Fe) mg TAKE 1 TABLET (324 MG TOTAL) BY MOUTH EVERY OTHER DAY   • Junel FE 1/20 1-20 MG-MCG per tablet TAKE 1 TABLET BY MOUTH EVERY DAY   • lidocaine (Lidoderm) 5 % Apply 1 patch topically over 12 hours daily Remove & Discard patch within 12 hours or as directed by MD   • omeprazole (PriLOSEC) 40 MG capsule Take 1 capsule (40 mg total) by mouth daily       Objective     /80 (BP Location: Right arm, Patient Position: Sitting, Cuff Size: Large)   Pulse 97   Temp 97.5 °F (36.4 °C) (Temporal)   Resp 16   Ht 5' 1" (1.549 m)   Wt 75.8 kg (167 lb)   LMP 08/10/2023 (Exact Date)   SpO2 99%   BMI 31.55 kg/m²     Physical Exam  Vitals and nursing note reviewed. Constitutional:       Appearance: She is well-developed. HENT:      Head: Normocephalic and atraumatic. Right Ear: External ear normal.      Left Ear: External ear normal.   Eyes:      Conjunctiva/sclera: Conjunctivae normal.   Cardiovascular:      Rate and Rhythm: Normal rate and regular rhythm. Heart sounds: Normal heart sounds. Pulmonary:      Effort: Pulmonary effort is normal. No respiratory distress. Musculoskeletal:      Right lower leg: No edema. Left lower leg: No edema. Neurological:      Mental Status: She is alert and oriented to person, place, and time.    Psychiatric:         Mood and Affect: Mood normal.         Behavior: Behavior normal.       Esthela Hernández MD

## 2023-08-21 NOTE — ASSESSMENT & PLAN NOTE
Discuss different options for Contraception  Pt declines any interventionalother methods at this time  Pt agreeable to possible OCP holiday for 3 months  Will monitor her menstrual bleeding, if excessive will start OCP again  Possible referral to Gyn as well in future

## 2023-09-14 ENCOUNTER — VBI (OUTPATIENT)
Dept: ADMINISTRATIVE | Facility: OTHER | Age: 48
End: 2023-09-14

## 2023-12-14 DIAGNOSIS — N92.0 MENORRHAGIA WITH REGULAR CYCLE: ICD-10-CM

## 2023-12-14 DIAGNOSIS — Z30.09 BIRTH CONTROL COUNSELING: ICD-10-CM

## 2023-12-14 RX ORDER — NORETHINDRONE ACETATE AND ETHINYL ESTRADIOL AND FERROUS FUMARATE 1MG-20(21)
KIT ORAL
Qty: 28 TABLET | Refills: 11 | Status: SHIPPED | OUTPATIENT
Start: 2023-12-14

## 2024-03-18 ENCOUNTER — TELEPHONE (OUTPATIENT)
Dept: FAMILY MEDICINE CLINIC | Facility: CLINIC | Age: 49
End: 2024-03-18

## 2024-03-18 DIAGNOSIS — Z11.59 NEED FOR HEPATITIS B SCREENING TEST: Primary | ICD-10-CM

## 2024-03-18 NOTE — TELEPHONE ENCOUNTER
[facility]/Health Record For [employer] Students form received on 3/18/24  to be completed by PCP. Copy made and placed in PCP folder.    Forms to be delivered to PCP mailbox at assigned time.    Patient would like a phone call when paperwork is done. Phone#:[phone]  Copy was made and placed in form copy bin.

## 2024-03-21 NOTE — TELEPHONE ENCOUNTER
School form need hepatitis b immunity test and proof of vaccine. I called patient she is not sure if she got the vaccines or not, she will double check and let us know   Surface antibodies ordered

## 2024-04-01 ENCOUNTER — APPOINTMENT (OUTPATIENT)
Dept: LAB | Facility: CLINIC | Age: 49
End: 2024-04-01

## 2024-04-01 DIAGNOSIS — Z11.59 NEED FOR HEPATITIS B SCREENING TEST: ICD-10-CM

## 2024-04-01 LAB — HBV SURFACE AB SER-ACNC: 325 MIU/ML

## 2024-04-01 PROCEDURE — 36415 COLL VENOUS BLD VENIPUNCTURE: CPT

## 2024-04-01 PROCEDURE — 86706 HEP B SURFACE ANTIBODY: CPT

## 2024-06-28 ENCOUNTER — TELEPHONE (OUTPATIENT)
Dept: FAMILY MEDICINE CLINIC | Facility: CLINIC | Age: 49
End: 2024-06-28

## 2024-06-28 NOTE — TELEPHONE ENCOUNTER
Record request from Martine Palomino received [phone] Faxed to [facility] [place]. Receipt received.

## 2024-12-17 ENCOUNTER — APPOINTMENT (OUTPATIENT)
Dept: URGENT CARE | Facility: MEDICAL CENTER | Age: 49
End: 2024-12-17

## 2024-12-28 ENCOUNTER — APPOINTMENT (OUTPATIENT)
Dept: URGENT CARE | Facility: MEDICAL CENTER | Age: 49
End: 2024-12-28

## 2025-03-14 ENCOUNTER — TELEPHONE (OUTPATIENT)
Dept: FAMILY MEDICINE CLINIC | Facility: CLINIC | Age: 50
End: 2025-03-14

## 2025-03-25 ENCOUNTER — OFFICE VISIT (OUTPATIENT)
Dept: FAMILY MEDICINE CLINIC | Facility: CLINIC | Age: 50
End: 2025-03-25

## 2025-03-25 VITALS
TEMPERATURE: 98.6 F | HEIGHT: 61 IN | RESPIRATION RATE: 18 BRPM | DIASTOLIC BLOOD PRESSURE: 84 MMHG | HEART RATE: 83 BPM | SYSTOLIC BLOOD PRESSURE: 128 MMHG | OXYGEN SATURATION: 99 % | WEIGHT: 168 LBS | BODY MASS INDEX: 31.72 KG/M2

## 2025-03-25 DIAGNOSIS — Z80.0 FAMILY HISTORY OF COLON CANCER: ICD-10-CM

## 2025-03-25 DIAGNOSIS — G89.29 CHRONIC RUQ PAIN: ICD-10-CM

## 2025-03-25 DIAGNOSIS — E66.9 OBESITY (BMI 30-39.9): ICD-10-CM

## 2025-03-25 DIAGNOSIS — N92.0 MENORRHAGIA WITH REGULAR CYCLE: ICD-10-CM

## 2025-03-25 DIAGNOSIS — D50.8 OTHER IRON DEFICIENCY ANEMIA: ICD-10-CM

## 2025-03-25 DIAGNOSIS — R10.11 CHRONIC RUQ PAIN: ICD-10-CM

## 2025-03-25 DIAGNOSIS — Z00.00 ANNUAL PHYSICAL EXAM: Primary | ICD-10-CM

## 2025-03-25 DIAGNOSIS — F41.1 GENERALIZED ANXIETY DISORDER: ICD-10-CM

## 2025-03-25 DIAGNOSIS — Z12.31 ENCOUNTER FOR SCREENING MAMMOGRAM FOR BREAST CANCER: ICD-10-CM

## 2025-03-25 DIAGNOSIS — Z12.11 SCREENING FOR COLORECTAL CANCER: ICD-10-CM

## 2025-03-25 DIAGNOSIS — Z12.12 SCREENING FOR COLORECTAL CANCER: ICD-10-CM

## 2025-03-25 DIAGNOSIS — D50.9 IRON DEFICIENCY ANEMIA, UNSPECIFIED IRON DEFICIENCY ANEMIA TYPE: ICD-10-CM

## 2025-03-25 PROCEDURE — 99214 OFFICE O/P EST MOD 30 MIN: CPT | Performed by: FAMILY MEDICINE

## 2025-03-25 PROCEDURE — 99396 PREV VISIT EST AGE 40-64: CPT | Performed by: FAMILY MEDICINE

## 2025-03-25 RX ORDER — FERROUS SULFATE 324(65)MG
324 TABLET, DELAYED RELEASE (ENTERIC COATED) ORAL EVERY OTHER DAY
Qty: 15 TABLET | Refills: 11 | Status: SHIPPED | OUTPATIENT
Start: 2025-03-25

## 2025-03-25 NOTE — ASSESSMENT & PLAN NOTE
Stable  [facility]    Orders:  •  CBC and differential; Future  •  Comprehensive metabolic panel; Future  •  TSH + Free T4; Future  •  Lipid panel; Future  •  Hemoglobin A1C; Future

## 2025-03-25 NOTE — PROGRESS NOTES
Adult Annual Physical  Name: Pepper Cotter      : 1975      MRN: [id number]  Encounter Provider: Yolanda Uriarte MD  Encounter Date: 3/25/2025   Encounter department: [facility]    Assessment & Plan  Annual physical exam  Preventive screenings and immunizations reviewed  Mammogram: pending  Colon cancer screening: pending       Chronic RUQ pain    Orders:  •  US right upper quadrant; Future    Generalized anxiety disorder  Stable  Follows with a Mental Office ([facility])    Orders:  •  CBC and differential; Future  •  Comprehensive metabolic panel; Future  •  TSH + Free T4; Future  •  Lipid panel; Future  •  Hemoglobin A1C; Future    Iron deficiency anemia, unspecified iron deficiency anemia type    Orders:  •  CBC and differential; Future    Obesity (BMI 30-39.9)  Body mass index is 31.74 kg/m².      Orders:  •  CBC and differential; Future  •  Comprehensive metabolic panel; Future  •  TSH + Free T4; Future  •  Lipid panel; Future  •  Hemoglobin A1C; Future    Encounter for screening mammogram for breast cancer    Orders:  •  Mammo screening bilateral w 3d and cad; Future    Screening for colorectal cancer    Orders:  •  Cologuard    Family history of colon cancer         BMI 31.0-31.9,adult         Menorrhagia with regular cycle    Orders:  •  ferrous sulfate 324 (65 Fe) mg; Take 1 tablet (324 mg total) by mouth every other day    Other iron deficiency anemia    Orders:  •  ferrous sulfate 324 (65 Fe) mg; Take 1 tablet (324 mg total) by mouth every other day    Preventive Screenings:  - Diabetes Screening: risks/benefits discussed and orders placed  - Cholesterol Screening: risks/benefits discussed and orders placed   - Hepatitis C screening: screening up-to-date   - HIV screening: patient declines and screening up-to-date   - Cervical cancer screening: risks/benefits discussed and orders placed   - Breast cancer screening: risks/benefits discussed and orders  "placed   - Colon cancer screening: risks/benefits discussed and orders placed   - Lung cancer screening: screening not indicated     Immunizations:    - The patient declines recommended vaccines currently despite my recommendations      Counseling/Anticipatory Guidance:  - Alcohol: discussed moderation in alcohol intake and recommendations for healthy alcohol use.   - Drug use: discussed harms of illicit drug use and how it can negatively impact mental/physical health.   - Tobacco use: discussed harms of tobacco use and management options for quitting.   - Dental health: discussed importance of regular tooth brushing, flossing, and dental visits.   - Sexual health: discussed sexually transmitted diseases, partner selection, use of condoms, avoidance of unintended pregnancy, and contraceptive alternatives.   - Diet: discussed recommendations for a healthy/well-balanced diet.   - Exercise: the importance of regular exercise/physical activity was discussed. Recommend exercise 3-5 times per week for at least 30 minutes.   - Injury prevention: discussed safety/seat belts, safety helmets, smoke detectors, carbon monoxide detectors, and smoking near bedding or upholstery.              History of Present Illness     Adult Annual Physical:  Patient presents for annual physical. Pepper Cotter is a 49 y.o. female who presented to the office today for her Annual Physical.  Pt continues to have epigastric and RUQ pain, the pain is in her right flank area like a \"soreness\"  H/o lower back pain with right sided sciatica  H/o Adenomyosis      The following portions of the patient's history were reviewed and updated as appropriate: allergies, current medications, past family history, past medical history, past social history, past surgical history and problem list.  .     Diet and Physical Activity:  - Diet/Nutrition: well balanced diet. likes homeade meals  - Exercise: 3-4 times a week on average and no formal exercise. physically " "active at work    Depression Screening:  - PHQ-2 Score: 0    General Health:  - Sleep: sleeps well.  - Hearing: normal hearing bilateral ears.  - Vision: wears glasses and most recent eye exam < 1 year ago.  - Dental: regular dental visits and brushes teeth twice daily.    /GYN Health:  - Follows with GYN: yes.   - Menopause: premenopausal.   - Last menstrual cycle: 3/3/2025.   - History of STDs: yes  - Contraception: IUD placement.      Advanced Care Planning:  - Has an advanced directive?: no    - Has a durable medical POA?: no    - ACP document given to patient?: yes      Review of Systems   Constitutional:  Negative for chills and fever.   HENT:  Negative for congestion, rhinorrhea and sore throat.    Respiratory:  Negative for cough and shortness of breath.    Cardiovascular:  Negative for chest pain.   Gastrointestinal:  Negative for diarrhea, nausea and vomiting.   Skin:  Negative for rash.   Neurological:  Negative for dizziness and headaches.         Objective   /84 (BP Location: Right arm, Patient Position: Sitting, Cuff Size: Extra-Large)   Pulse 83   Temp 98.6 °F (37 °C) (Temporal)   Resp 18   Ht 5' 1\" (1.549 m)   Wt 76.2 kg (168 lb)   LMP 03/03/2025 (Exact Date)   SpO2 99%   BMI 31.74 kg/m²     Physical Exam  Vitals and nursing note reviewed.   Constitutional:       General: She is not in acute distress.     Appearance: She is well-developed.   HENT:      Head: Normocephalic and atraumatic.   Eyes:      Conjunctiva/sclera: Conjunctivae normal.   Cardiovascular:      Rate and Rhythm: Normal rate and regular rhythm.      Heart sounds: No murmur heard.  Pulmonary:      Effort: Pulmonary effort is normal. No respiratory distress.      Breath sounds: Normal breath sounds.   Abdominal:      Palpations: Abdomen is soft.      Tenderness: There is no abdominal tenderness.   Musculoskeletal:         General: No swelling.      Cervical back: Neck supple.   Skin:     General: Skin is warm and dry. "      Capillary Refill: Capillary refill takes less than 2 seconds.   Neurological:      Mental Status: She is alert.   Psychiatric:         Mood and Affect: Mood normal.

## 2025-03-25 NOTE — ASSESSMENT & PLAN NOTE
Orders:  •  ferrous sulfate 324 (65 Fe) mg; Take 1 tablet (324 mg total) by mouth every other day

## 2025-03-26 ENCOUNTER — TELEPHONE (OUTPATIENT)
Dept: ADMINISTRATIVE | Facility: OTHER | Age: 50
End: 2025-03-26

## 2025-03-26 NOTE — TELEPHONE ENCOUNTER
----- Message from Yolanda Uriarte MD sent at 3/25/2025  5:18 PM EDT -----  Regarding: Care Gap Request  03/25/25 5:18 PM    Hello, our patient Pepper Cotter has had HIV completed/performed. Please assist in updating the patient chart by pulling a previous Electronic Medical Record (EMR) document. The previous EMR is unknown. The date of service is 2020.    Thank you,  Yolanda Uriarte MD  [facility]

## 2025-03-31 ENCOUNTER — HOSPITAL ENCOUNTER (OUTPATIENT)
Dept: ULTRASOUND IMAGING | Facility: HOSPITAL | Age: 50
Discharge: HOME | End: 2025-03-31
Payer: COMMERCIAL

## 2025-03-31 DIAGNOSIS — R10.11 CHRONIC RUQ PAIN: ICD-10-CM

## 2025-03-31 DIAGNOSIS — G89.29 CHRONIC RUQ PAIN: ICD-10-CM

## 2025-03-31 PROCEDURE — 76705 ECHO EXAM OF ABDOMEN: CPT

## 2025-04-04 ENCOUNTER — RESULTS FOLLOW-UP (OUTPATIENT)
Dept: FAMILY MEDICINE CLINIC | Facility: CLINIC | Age: 50
End: 2025-04-04

## 2025-04-17 LAB — COLOGUARD RESULT REPORTABLE: NEGATIVE

## 2025-04-23 ENCOUNTER — HOSPITAL ENCOUNTER (OUTPATIENT)
Dept: MAMMOGRAPHY | Facility: MEDICAL CENTER | Age: 50
Discharge: HOME | End: 2025-04-23
Payer: COMMERCIAL

## 2025-04-23 VITALS — BODY MASS INDEX: 31.72 KG/M2 | WEIGHT: 168 LBS | HEIGHT: 61 IN

## 2025-04-23 DIAGNOSIS — Z12.31 ENCOUNTER FOR SCREENING MAMMOGRAM FOR BREAST CANCER: ICD-10-CM

## 2025-04-23 PROCEDURE — 77067 SCR MAMMO BI INCL CAD: CPT

## 2025-04-23 PROCEDURE — 77063 BREAST TOMOSYNTHESIS BI: CPT

## 2025-05-30 ENCOUNTER — TELEPHONE (OUTPATIENT)
Dept: MAMMOGRAPHY | Facility: CLINIC | Age: 50
End: 2025-05-30

## 2025-06-03 ENCOUNTER — HOSPITAL ENCOUNTER (OUTPATIENT)
Dept: MAMMOGRAPHY | Facility: CLINIC | Age: 50
Discharge: HOME | End: 2025-06-03
Attending: FAMILY MEDICINE
Payer: COMMERCIAL

## 2025-06-03 VITALS — BODY MASS INDEX: 31.72 KG/M2 | WEIGHT: 168 LBS | HEIGHT: 61 IN

## 2025-06-03 DIAGNOSIS — R92.8 ABNORMAL SCREENING MAMMOGRAM: ICD-10-CM

## 2025-06-03 PROCEDURE — 76642 ULTRASOUND BREAST LIMITED: CPT

## 2025-06-03 PROCEDURE — G0279 TOMOSYNTHESIS, MAMMO: HCPCS

## 2025-06-03 PROCEDURE — 77065 DX MAMMO INCL CAD UNI: CPT

## 2025-06-03 NOTE — PROGRESS NOTES
Met with patient and Dr. Carver regarding recommendation for;      _____ RIGHT __x____LEFT      __x___Ultrasound guided  ______Stereotactic  Breast biopsy.      __x___Verbalized understanding.      Blood thinners:  _____yes __x___no    Date stopped: ___________    Biopsy teaching sheet given and reviewed with patient verbalizing understanding and questions/concerns addressed at this time:  ___x____yes ______no

## 2025-07-15 ENCOUNTER — HOSPITAL ENCOUNTER (OUTPATIENT)
Dept: ULTRASOUND IMAGING | Facility: CLINIC | Age: 50
Discharge: HOME | End: 2025-07-15
Attending: FAMILY MEDICINE

## 2025-07-16 ENCOUNTER — HOSPITAL ENCOUNTER (OUTPATIENT)
Dept: ULTRASOUND IMAGING | Facility: CLINIC | Age: 50
Discharge: HOME | End: 2025-07-16
Attending: FAMILY MEDICINE
Payer: COMMERCIAL

## 2025-07-16 ENCOUNTER — HOSPITAL ENCOUNTER (OUTPATIENT)
Dept: MAMMOGRAPHY | Facility: CLINIC | Age: 50
Discharge: HOME | End: 2025-07-16
Payer: COMMERCIAL

## 2025-07-16 VITALS — HEART RATE: 85 BPM | DIASTOLIC BLOOD PRESSURE: 90 MMHG | SYSTOLIC BLOOD PRESSURE: 152 MMHG

## 2025-07-16 DIAGNOSIS — R92.8 ABNORMAL ULTRASOUND OF BREAST: ICD-10-CM

## 2025-07-16 PROCEDURE — 88305 TISSUE EXAM BY PATHOLOGIST: CPT | Performed by: SPECIALIST

## 2025-07-16 PROCEDURE — A4648 IMPLANTABLE TISSUE MARKER: HCPCS

## 2025-07-16 PROCEDURE — 19083 BX BREAST 1ST LESION US IMAG: CPT

## 2025-07-16 RX ORDER — LIDOCAINE HYDROCHLORIDE 10 MG/ML
5 INJECTION, SOLUTION EPIDURAL; INFILTRATION; INTRACAUDAL; PERINEURAL ONCE
Status: COMPLETED | OUTPATIENT
Start: 2025-07-16 | End: 2025-07-16

## 2025-07-16 RX ADMIN — LIDOCAINE HYDROCHLORIDE 5 ML: 10 INJECTION, SOLUTION EPIDURAL; INFILTRATION; INTRACAUDAL; PERINEURAL at 11:03

## 2025-07-16 NOTE — PROGRESS NOTES
Procedure type:    __x___ultrasound guided _____stereotactic    Breast:    __x___Left _____Right     Location: 11:00 6 cmfn    Needle: 14g    # of passes: 2    Clip: Open coil    Performed by: Dr. Mccormick      Pressure held for 5 minutes by: Wisam hPillips Strips:    ___X__yes _____no    Band aid:    __X___yes_____no    Tolerated procedure:    __X___yes _____no

## 2025-07-17 ENCOUNTER — TELEPHONE (OUTPATIENT)
Dept: MAMMOGRAPHY | Facility: CLINIC | Age: 50
End: 2025-07-17

## 2025-07-17 PROCEDURE — 88305 TISSUE EXAM BY PATHOLOGIST: CPT | Performed by: SPECIALIST
